# Patient Record
Sex: FEMALE | Race: WHITE | Employment: OTHER | URBAN - METROPOLITAN AREA
[De-identification: names, ages, dates, MRNs, and addresses within clinical notes are randomized per-mention and may not be internally consistent; named-entity substitution may affect disease eponyms.]

---

## 2019-04-01 ENCOUNTER — OFFICE VISIT (OUTPATIENT)
Dept: NEUROLOGY | Facility: CLINIC | Age: 54
End: 2019-04-01
Payer: MEDICARE

## 2019-04-01 VITALS
WEIGHT: 280 LBS | HEART RATE: 84 BPM | DIASTOLIC BLOOD PRESSURE: 80 MMHG | BODY MASS INDEX: 41.47 KG/M2 | HEIGHT: 69 IN | SYSTOLIC BLOOD PRESSURE: 138 MMHG | RESPIRATION RATE: 16 BRPM

## 2019-04-01 DIAGNOSIS — M25.511 CHRONIC RIGHT SHOULDER PAIN: Primary | ICD-10-CM

## 2019-04-01 DIAGNOSIS — G89.29 CHRONIC RIGHT SHOULDER PAIN: Primary | ICD-10-CM

## 2019-04-01 DIAGNOSIS — Z98.1 S/P CERVICAL SPINAL FUSION: ICD-10-CM

## 2019-04-01 DIAGNOSIS — M54.12 CERVICAL RADICULOPATHY: ICD-10-CM

## 2019-04-01 DIAGNOSIS — M54.2 NECK PAIN ON RIGHT SIDE: ICD-10-CM

## 2019-04-01 DIAGNOSIS — M67.911 DYSFUNCTION OF RIGHT ROTATOR CUFF: ICD-10-CM

## 2019-04-01 PROBLEM — Q76.1 CERVICAL FUSION SYNDROME: Status: ACTIVE | Noted: 2019-04-01

## 2019-04-01 PROBLEM — Q76.1 CERVICAL FUSION SYNDROME: Status: RESOLVED | Noted: 2019-04-01 | Resolved: 2019-04-01

## 2019-04-01 PROCEDURE — 99204 OFFICE O/P NEW MOD 45 MIN: CPT | Performed by: PHYSICAL MEDICINE & REHABILITATION

## 2019-04-01 RX ORDER — AMITRIPTYLINE HYDROCHLORIDE 25 MG/1
TABLET, FILM COATED ORAL AS NEEDED
Refills: 0 | COMMUNITY
Start: 2019-02-25

## 2019-04-01 NOTE — PROGRESS NOTES
Cervical Pain H & P    Chief Complaint:  Patient presents with:  Arm Pain: new patient here for knot on the shoulder blade that radiates down the right arm pain currently 6/10      HPI:  Juliane Mark is a 53year old year old right handed female with a pr history.     Social History   Social History    Socioeconomic History      Marital status: Unknown      Spouse name: Not on file      Number of children: Not on file      Years of education: Not on file      Highest education level: Not on file    Occupatio intolerance.   Neuro:   Negative for headaches, memory impairment  Psych:   Positive for anxiety, depression, and coccasional insomnia  Integumentary: Negative for rash, skin lesion  Hema/Lymph:  Negative for easy bleeding and easy bruising  Allergic/Immuno external rotators Right: 3+/5  Serratus anterior Right: 3/5   Reflexes: 2+ In the bilateral upper extremities. Valencia's sign Right: Negative   Valencia's sigh Left: Negative     Shoulder: The shoulders are stable.     Medial Border Scapular Winging: absen

## 2019-04-01 NOTE — PATIENT INSTRUCTIONS
Plan  She will get a right shoulder x-ray and cervical spine x-rays. I will do an ultrasound of the right shoulder for diagnostic purposes.     Once I have the results of the above, I will be better able to determine if she will need to have PT on the

## 2019-04-25 ENCOUNTER — TELEPHONE (OUTPATIENT)
Dept: NEUROLOGY | Facility: CLINIC | Age: 54
End: 2019-04-25

## 2019-04-25 NOTE — TELEPHONE ENCOUNTER
Right arm and top of shoulder, which worsened about a week and a half ago when pt fell, rates pain 9/10 and is constant, worsens w/ different positions, has to brace arm to body, is unable to straighten it.  Described pain: begins in Right side of neck down

## 2019-04-29 NOTE — TELEPHONE ENCOUNTER
Spoke to patient and informed her of Dr. Caitlin Daniels response. Patient verbalized understanding and offered appt tomorrow but declined as she has another appt.  Patient confirmed appt in [facility] 5/1/19 at 9:30a and says she will go early and have Xrays done pr

## 2019-05-01 ENCOUNTER — HOSPITAL ENCOUNTER (OUTPATIENT)
Dept: GENERAL RADIOLOGY | Age: 54
Discharge: HOME | End: 2019-05-01
Attending: PHYSICAL MEDICINE & REHABILITATION
Payer: MEDICARE

## 2019-05-01 ENCOUNTER — OFFICE VISIT (OUTPATIENT)
Dept: NEUROLOGY | Facility: CLINIC | Age: 54
End: 2019-05-01
Payer: MEDICARE

## 2019-05-01 ENCOUNTER — TELEPHONE (OUTPATIENT)
Dept: NEUROLOGY | Facility: CLINIC | Age: 54
End: 2019-05-01

## 2019-05-01 VITALS
HEIGHT: 69 IN | BODY MASS INDEX: 41.32 KG/M2 | HEART RATE: 80 BPM | WEIGHT: 279 LBS | RESPIRATION RATE: 16 BRPM | DIASTOLIC BLOOD PRESSURE: 100 MMHG | SYSTOLIC BLOOD PRESSURE: 148 MMHG

## 2019-05-01 DIAGNOSIS — M54.2 NECK PAIN ON RIGHT SIDE: ICD-10-CM

## 2019-05-01 DIAGNOSIS — M67.911 DYSFUNCTION OF RIGHT ROTATOR CUFF: ICD-10-CM

## 2019-05-01 DIAGNOSIS — G89.29 CHRONIC RIGHT SHOULDER PAIN: ICD-10-CM

## 2019-05-01 DIAGNOSIS — M50.90 CERVICAL DISC DISEASE: ICD-10-CM

## 2019-05-01 DIAGNOSIS — Z98.1 S/P CERVICAL SPINAL FUSION: ICD-10-CM

## 2019-05-01 DIAGNOSIS — M75.121 NONTRAUMATIC COMPLETE TEAR OF RIGHT ROTATOR CUFF: ICD-10-CM

## 2019-05-01 DIAGNOSIS — M54.12 CERVICAL RADICULOPATHY: ICD-10-CM

## 2019-05-01 DIAGNOSIS — M25.511 CHRONIC RIGHT SHOULDER PAIN: Primary | ICD-10-CM

## 2019-05-01 DIAGNOSIS — G89.29 CHRONIC RIGHT SHOULDER PAIN: Primary | ICD-10-CM

## 2019-05-01 DIAGNOSIS — M25.511 CHRONIC RIGHT SHOULDER PAIN: ICD-10-CM

## 2019-05-01 PROBLEM — S46.011A TRAUMATIC COMPLETE TEAR OF RIGHT ROTATOR CUFF: Status: RESOLVED | Noted: 2019-05-01 | Resolved: 2019-05-01

## 2019-05-01 PROBLEM — S46.011A TRAUMATIC COMPLETE TEAR OF RIGHT ROTATOR CUFF: Status: ACTIVE | Noted: 2019-05-01

## 2019-05-01 PROCEDURE — 76881 US COMPL JOINT R-T W/IMG: CPT | Performed by: PHYSICAL MEDICINE & REHABILITATION

## 2019-05-01 PROCEDURE — 72050 X-RAY EXAM NECK SPINE 4/5VWS: CPT | Performed by: PHYSICAL MEDICINE & REHABILITATION

## 2019-05-01 PROCEDURE — 20611 DRAIN/INJ JOINT/BURSA W/US: CPT | Performed by: PHYSICAL MEDICINE & REHABILITATION

## 2019-05-01 PROCEDURE — 73030 X-RAY EXAM OF SHOULDER: CPT | Performed by: PHYSICAL MEDICINE & REHABILITATION

## 2019-05-01 RX ORDER — TRIAMCINOLONE ACETONIDE 40 MG/ML
60 INJECTION, SUSPENSION INTRA-ARTICULAR; INTRAMUSCULAR ONCE
Status: COMPLETED | OUTPATIENT
Start: 2019-05-01 | End: 2019-05-01

## 2019-05-01 RX ORDER — LIDOCAINE HYDROCHLORIDE 10 MG/ML
2.5 INJECTION, SOLUTION INFILTRATION; PERINEURAL ONCE
Status: COMPLETED | OUTPATIENT
Start: 2019-05-01 | End: 2019-05-01

## 2019-05-01 NOTE — TELEPHONE ENCOUNTER
Pt. informed insurance was verified and MRI right shoulder wo cpt code 47078  is a covered benefit and does not require authorization. Can proceed with scheduling appt.

## 2019-05-01 NOTE — PROCEDURES
I did an ultrasound examination of the right shoulder in the office today:   Biceps Tendon:   Normal   Subscapularis Tendon:  Mild bursal surface partial thickness tear  AC Joint:   Normal  Impingement Sign:  Normal  Subdeltoid Bursa:  Normal  Posterior Gl

## 2019-05-01 NOTE — TELEPHONE ENCOUNTER
Medicare Online for insurance coverage of Right shoulder injection under ultrasound guidance. cpt codes 36089, [id number] . Insurance was verified and procedure is a covered benefit and does not require authorization. Kae PORTILLO CMA informed.

## 2019-05-03 ENCOUNTER — MED REC SCAN ONLY (OUTPATIENT)
Dept: NEUROLOGY | Facility: CLINIC | Age: 54
End: 2019-05-03

## 2019-05-07 ENCOUNTER — HOSPITAL ENCOUNTER (OUTPATIENT)
Dept: MRI IMAGING | Facility: HOSPITAL | Age: 54
Discharge: HOME | End: 2019-05-07
Attending: PHYSICAL MEDICINE & REHABILITATION
Payer: MEDICARE

## 2019-05-07 DIAGNOSIS — M25.511 CHRONIC RIGHT SHOULDER PAIN: ICD-10-CM

## 2019-05-07 DIAGNOSIS — M75.121 NONTRAUMATIC COMPLETE TEAR OF RIGHT ROTATOR CUFF: ICD-10-CM

## 2019-05-07 DIAGNOSIS — G89.29 CHRONIC RIGHT SHOULDER PAIN: ICD-10-CM

## 2019-05-07 DIAGNOSIS — M67.911 DYSFUNCTION OF RIGHT ROTATOR CUFF: ICD-10-CM

## 2019-05-07 PROCEDURE — 73221 MRI JOINT UPR EXTREM W/O DYE: CPT | Performed by: PHYSICAL MEDICINE & REHABILITATION

## 2019-05-16 PROBLEM — S46.011A TRAUMATIC COMPLETE TEAR OF RIGHT ROTATOR CUFF, INITIAL ENCOUNTER: Status: ACTIVE | Noted: 2019-05-16

## 2019-05-16 PROBLEM — M25.511 ACUTE PAIN OF RIGHT SHOULDER: Status: ACTIVE | Noted: 2019-05-16

## 2019-06-20 PROBLEM — Z47.89 AFTERCARE FOLLOWING SURGERY OF THE MUSCULOSKELETAL SYSTEM: Status: ACTIVE | Noted: 2019-06-20

## 2021-11-30 ENCOUNTER — LAB ENCOUNTER (OUTPATIENT)
Dept: LAB | Facility: HOSPITAL | Age: 56
End: 2021-11-30
Attending: INTERNAL MEDICINE
Payer: MEDICARE

## 2021-11-30 ENCOUNTER — HOSPITAL ENCOUNTER (OUTPATIENT)
Dept: ULTRASOUND IMAGING | Facility: HOSPITAL | Age: 56
Discharge: HOME | End: 2021-11-30
Attending: INTERNAL MEDICINE
Payer: MEDICARE

## 2021-11-30 DIAGNOSIS — N18.30 STAGE 3 CHRONIC KIDNEY DISEASE, UNSPECIFIED WHETHER STAGE 3A OR 3B CKD (HCC): ICD-10-CM

## 2021-11-30 DIAGNOSIS — Z90.5 ACQUIRED ABSENCE OF KIDNEY: ICD-10-CM

## 2021-11-30 DIAGNOSIS — N18.32 CHRONIC KIDNEY DISEASE (CKD) STAGE G3B/A1, MODERATELY DECREASED GLOMERULAR FILTRATION RATE (GFR) BETWEEN 30-44 ML/MIN/1.73 SQUARE METER AND ALBUMINURIA CREATININE RATIO LESS THAN 30 MG/G (HCC): Primary | ICD-10-CM

## 2021-11-30 DIAGNOSIS — E66.9 OBESITY, UNSPECIFIED: ICD-10-CM

## 2021-11-30 DIAGNOSIS — R03.0 ELEVATED BLOOD PRESSURE READING WITHOUT DIAGNOSIS OF HYPERTENSION: ICD-10-CM

## 2021-11-30 PROCEDURE — 85018 HEMOGLOBIN: CPT

## 2021-11-30 PROCEDURE — 80069 RENAL FUNCTION PANEL: CPT

## 2021-11-30 PROCEDURE — 76775 US EXAM ABDO BACK WALL LIM: CPT | Performed by: INTERNAL MEDICINE

## 2021-11-30 PROCEDURE — 82570 ASSAY OF URINE CREATININE: CPT

## 2021-11-30 PROCEDURE — 36415 COLL VENOUS BLD VENIPUNCTURE: CPT

## 2021-11-30 PROCEDURE — 84156 ASSAY OF PROTEIN URINE: CPT

## 2021-11-30 PROCEDURE — 82043 UR ALBUMIN QUANTITATIVE: CPT

## 2021-11-30 PROCEDURE — 83970 ASSAY OF PARATHORMONE: CPT

## 2021-11-30 PROCEDURE — 85014 HEMATOCRIT: CPT

## 2022-02-09 ENCOUNTER — HOSPITAL ENCOUNTER (EMERGENCY)
Facility: HOSPITAL | Age: 57
Discharge: HOME | End: 2022-02-09
Payer: MEDICARE

## 2022-02-09 VITALS
SYSTOLIC BLOOD PRESSURE: 115 MMHG | BODY MASS INDEX: 40.6 KG/M2 | DIASTOLIC BLOOD PRESSURE: 75 MMHG | RESPIRATION RATE: 18 BRPM | OXYGEN SATURATION: 99 % | TEMPERATURE: 98 F | HEIGHT: 68.5 IN | HEART RATE: 97 BPM | WEIGHT: 271 LBS

## 2022-02-09 DIAGNOSIS — T14.8XXA SPLINTER: Primary | ICD-10-CM

## 2022-02-09 PROCEDURE — 99283 EMERGENCY DEPT VISIT LOW MDM: CPT

## 2022-02-09 RX ORDER — CEPHALEXIN 500 MG/1
500 CAPSULE ORAL 3 TIMES DAILY
Qty: 21 CAPSULE | Refills: 0 | Status: SHIPPED | OUTPATIENT
Start: 2022-02-09 | End: 2022-02-16

## 2022-07-13 ENCOUNTER — APPOINTMENT (OUTPATIENT)
Dept: GENERAL RADIOLOGY | Facility: HOSPITAL | Age: 57
End: 2022-07-13
Attending: EMERGENCY MEDICINE
Payer: MEDICARE

## 2022-07-13 ENCOUNTER — HOSPITAL ENCOUNTER (EMERGENCY)
Facility: HOSPITAL | Age: 57
Discharge: HOME | End: 2022-07-13
Attending: EMERGENCY MEDICINE
Payer: MEDICARE

## 2022-07-13 VITALS
TEMPERATURE: 98 F | HEART RATE: 87 BPM | BODY MASS INDEX: 40.92 KG/M2 | RESPIRATION RATE: 18 BRPM | DIASTOLIC BLOOD PRESSURE: 80 MMHG | SYSTOLIC BLOOD PRESSURE: 120 MMHG | OXYGEN SATURATION: 96 % | HEIGHT: 68 IN | WEIGHT: 270 LBS

## 2022-07-13 DIAGNOSIS — U07.1 COVID-19 VIRUS INFECTION: Primary | ICD-10-CM

## 2022-07-13 LAB
S PYO AG THROAT QL: NEGATIVE
SARS-COV-2 RNA RESP QL NAA+PROBE: DETECTED

## 2022-07-13 PROCEDURE — 99283 EMERGENCY DEPT VISIT LOW MDM: CPT

## 2022-07-13 PROCEDURE — 71045 X-RAY EXAM CHEST 1 VIEW: CPT | Performed by: EMERGENCY MEDICINE

## 2022-07-13 PROCEDURE — 87880 STREP A ASSAY W/OPTIC: CPT

## 2022-07-13 NOTE — ED QUICK NOTES
Patient cleared for discharge by MD. Brito with patient. Patient discharge instructions reviewed with patient including when and how to follow up  with healthcare provider and when to seek medical treatment.

## 2022-09-13 ENCOUNTER — LAB ENCOUNTER (OUTPATIENT)
Dept: LAB | Facility: HOSPITAL | Age: 57
End: 2022-09-13
Attending: INTERNAL MEDICINE
Payer: MEDICARE

## 2022-09-13 DIAGNOSIS — N25.81 SECONDARY HYPERPARATHYROIDISM OF RENAL ORIGIN (HCC): ICD-10-CM

## 2022-09-13 DIAGNOSIS — R03.0 ELEVATED BLOOD PRESSURE READING WITHOUT DIAGNOSIS OF HYPERTENSION: ICD-10-CM

## 2022-09-13 DIAGNOSIS — Z90.5 ACQUIRED ABSENCE OF KIDNEY: ICD-10-CM

## 2022-09-13 DIAGNOSIS — N18.31 CHRONIC KIDNEY DISEASE (CKD) STAGE G3A/A1, MODERATELY DECREASED GLOMERULAR FILTRATION RATE (GFR) BETWEEN 45-59 ML/MIN/1.73 SQUARE METER AND ALBUMINURIA CREATININE RATIO LESS THAN 30 MG/G (HCC): Primary | ICD-10-CM

## 2022-09-13 DIAGNOSIS — N18.31 STAGE 3A CHRONIC KIDNEY DISEASE (HCC): ICD-10-CM

## 2022-09-13 DIAGNOSIS — Z90.5 SOLITARY KIDNEY, ACQUIRED: ICD-10-CM

## 2022-09-13 LAB
ALBUMIN SERPL-MCNC: 3.8 G/DL (ref 3.4–5)
ANION GAP SERPL CALC-SCNC: 6 MMOL/L (ref 0–18)
BILIRUB UR QL: NEGATIVE
BUN BLD-MCNC: 20 MG/DL (ref 7–18)
BUN/CREAT SERPL: 16.3 (ref 10–20)
CALCIUM BLD-MCNC: 9 MG/DL (ref 8.5–10.1)
CHLORIDE SERPL-SCNC: 106 MMOL/L (ref 98–112)
CLARITY UR: CLEAR
CO2 SERPL-SCNC: 26 MMOL/L (ref 21–32)
COLOR UR: YELLOW
CREAT BLD-MCNC: 1.23 MG/DL
CREAT UR-SCNC: 54.2 MG/DL
GFR SERPLBLD BASED ON 1.73 SQ M-ARVRAT: 51 ML/MIN/1.73M2 (ref 60–?)
GLUCOSE BLD-MCNC: 87 MG/DL (ref 70–99)
GLUCOSE UR-MCNC: NEGATIVE MG/DL
HGB UR QL STRIP.AUTO: NEGATIVE
KETONES UR-MCNC: NEGATIVE MG/DL
LEUKOCYTE ESTERASE UR QL STRIP.AUTO: NEGATIVE
NITRITE UR QL STRIP.AUTO: NEGATIVE
OSMOLALITY SERPL CALC.SUM OF ELEC: 288 MOSM/KG (ref 275–295)
PH UR: 5.5 [PH] (ref 5–8)
PHOSPHATE SERPL-MCNC: 2.8 MG/DL (ref 2.5–4.9)
POTASSIUM SERPL-SCNC: 4.4 MMOL/L (ref 3.5–5.1)
PROT UR-MCNC: <5 MG/DL
PROT UR-MCNC: NEGATIVE MG/DL
SODIUM SERPL-SCNC: 138 MMOL/L (ref 136–145)
SP GR UR STRIP: 1.01 (ref 1–1.03)
UROBILINOGEN UR STRIP-ACNC: 0.2

## 2022-09-13 PROCEDURE — 82570 ASSAY OF URINE CREATININE: CPT

## 2022-09-13 PROCEDURE — 84156 ASSAY OF PROTEIN URINE: CPT

## 2022-09-13 PROCEDURE — 81015 MICROSCOPIC EXAM OF URINE: CPT

## 2022-09-13 PROCEDURE — 81001 URINALYSIS AUTO W/SCOPE: CPT

## 2022-09-13 PROCEDURE — 36415 COLL VENOUS BLD VENIPUNCTURE: CPT

## 2022-09-13 PROCEDURE — 80069 RENAL FUNCTION PANEL: CPT

## 2022-11-17 ENCOUNTER — HOSPITAL ENCOUNTER (EMERGENCY)
Facility: HOSPITAL | Age: 57
Discharge: HOME | End: 2022-11-17
Attending: EMERGENCY MEDICINE
Payer: MEDICARE

## 2022-11-17 VITALS
RESPIRATION RATE: 16 BRPM | WEIGHT: 269 LBS | HEART RATE: 92 BPM | DIASTOLIC BLOOD PRESSURE: 84 MMHG | SYSTOLIC BLOOD PRESSURE: 133 MMHG | BODY MASS INDEX: 42.22 KG/M2 | TEMPERATURE: 98 F | HEIGHT: 67 IN | OXYGEN SATURATION: 95 %

## 2022-11-17 DIAGNOSIS — K62.5 RECTAL BLEEDING: Primary | ICD-10-CM

## 2022-11-17 LAB
ANION GAP SERPL CALC-SCNC: 6 MMOL/L (ref 0–18)
BASOPHILS # BLD AUTO: 0.04 X10(3) UL (ref 0–0.2)
BASOPHILS NFR BLD AUTO: 0.5 %
BUN BLD-MCNC: 15 MG/DL (ref 7–18)
BUN/CREAT SERPL: 11.7 (ref 10–20)
CALCIUM BLD-MCNC: 9.3 MG/DL (ref 8.5–10.1)
CHLORIDE SERPL-SCNC: 107 MMOL/L (ref 98–112)
CO2 SERPL-SCNC: 25 MMOL/L (ref 21–32)
CREAT BLD-MCNC: 1.28 MG/DL
DEPRECATED RDW RBC AUTO: 43 FL (ref 35.1–46.3)
EOSINOPHIL # BLD AUTO: 0.16 X10(3) UL (ref 0–0.7)
EOSINOPHIL NFR BLD AUTO: 2.1 %
ERYTHROCYTE [DISTWIDTH] IN BLOOD BY AUTOMATED COUNT: 12.6 % (ref 11–15)
GFR SERPLBLD BASED ON 1.73 SQ M-ARVRAT: 49 ML/MIN/1.73M2 (ref 60–?)
GLUCOSE BLD-MCNC: 92 MG/DL (ref 70–99)
HCT VFR BLD AUTO: 42.4 %
HGB BLD-MCNC: 14.2 G/DL
IMM GRANULOCYTES # BLD AUTO: 0.04 X10(3) UL (ref 0–1)
IMM GRANULOCYTES NFR BLD: 0.5 %
LYMPHOCYTES # BLD AUTO: 2.65 X10(3) UL (ref 1–4)
LYMPHOCYTES NFR BLD AUTO: 34.8 %
MCH RBC QN AUTO: 30.8 PG (ref 26–34)
MCHC RBC AUTO-ENTMCNC: 33.5 G/DL (ref 31–37)
MCV RBC AUTO: 92 FL
MONOCYTES # BLD AUTO: 0.44 X10(3) UL (ref 0.1–1)
MONOCYTES NFR BLD AUTO: 5.8 %
NEUTROPHILS # BLD AUTO: 4.29 X10 (3) UL (ref 1.5–7.7)
NEUTROPHILS # BLD AUTO: 4.29 X10(3) UL (ref 1.5–7.7)
NEUTROPHILS NFR BLD AUTO: 56.3 %
OSMOLALITY SERPL CALC.SUM OF ELEC: 286 MOSM/KG (ref 275–295)
PLATELET # BLD AUTO: 246 10(3)UL (ref 150–450)
POTASSIUM SERPL-SCNC: 4.5 MMOL/L (ref 3.5–5.1)
RBC # BLD AUTO: 4.61 X10(6)UL
SODIUM SERPL-SCNC: 138 MMOL/L (ref 136–145)
WBC # BLD AUTO: 7.6 X10(3) UL (ref 4–11)

## 2022-11-17 PROCEDURE — 80048 BASIC METABOLIC PNL TOTAL CA: CPT | Performed by: EMERGENCY MEDICINE

## 2022-11-17 PROCEDURE — 36415 COLL VENOUS BLD VENIPUNCTURE: CPT

## 2022-11-17 PROCEDURE — 99283 EMERGENCY DEPT VISIT LOW MDM: CPT

## 2022-11-17 PROCEDURE — 85025 COMPLETE CBC W/AUTO DIFF WBC: CPT | Performed by: EMERGENCY MEDICINE

## 2022-11-17 PROCEDURE — 99284 EMERGENCY DEPT VISIT MOD MDM: CPT

## 2022-11-17 RX ORDER — ACETAMINOPHEN 500 MG
1000 TABLET ORAL ONCE
Status: COMPLETED | OUTPATIENT
Start: 2022-11-17 | End: 2022-11-17

## 2022-11-17 RX ORDER — DULOXETIN HYDROCHLORIDE 30 MG/1
30 CAPSULE, DELAYED RELEASE ORAL DAILY
COMMUNITY

## 2022-11-17 RX ORDER — BUSPIRONE HYDROCHLORIDE 10 MG/1
10 TABLET ORAL NIGHTLY
COMMUNITY

## 2022-11-17 NOTE — DISCHARGE INSTRUCTIONS
Your hemoglobin is stable today. Call the GI clinic to see if you can get an earlier appointment. Return to the ER for heavy and persistent bleeding.

## 2022-11-17 NOTE — ED INITIAL ASSESSMENT (HPI)
Pt to ED c.o rectal bleeding bright red blood espeically when she wipes and stool and water is dark red every time she had BM x past 3 weeks, PMD Abdi Lopez advised her to come in to make sure she's not losing too much blood, denies dizziness. +fatigue. No CP/SOB, not taking a blood thinner.

## 2023-04-28 ENCOUNTER — ANESTHESIA (OUTPATIENT)
Dept: ENDOSCOPY | Facility: HOSPITAL | Age: 58
End: 2023-04-28
Payer: MEDICARE

## 2023-04-28 ENCOUNTER — ANESTHESIA EVENT (OUTPATIENT)
Dept: ENDOSCOPY | Facility: HOSPITAL | Age: 58
End: 2023-04-28
Payer: MEDICARE

## 2023-04-28 ENCOUNTER — HOSPITAL ENCOUNTER (OUTPATIENT)
Facility: HOSPITAL | Age: 58
Setting detail: HOSPITAL OUTPATIENT SURGERY
Discharge: HOME | End: 2023-04-28
Attending: INTERNAL MEDICINE | Admitting: INTERNAL MEDICINE
Payer: MEDICARE

## 2023-04-28 VITALS
BODY MASS INDEX: 41.37 KG/M2 | SYSTOLIC BLOOD PRESSURE: 129 MMHG | HEIGHT: 68 IN | RESPIRATION RATE: 13 BRPM | HEART RATE: 78 BPM | TEMPERATURE: 97 F | WEIGHT: 273 LBS | OXYGEN SATURATION: 97 % | DIASTOLIC BLOOD PRESSURE: 79 MMHG

## 2023-04-28 DIAGNOSIS — K62.5 RECTAL BLEEDING: ICD-10-CM

## 2023-04-28 DIAGNOSIS — R19.4 ALTERED BOWEL HABITS: ICD-10-CM

## 2023-04-28 DIAGNOSIS — R10.30 LOWER ABDOMINAL PAIN: ICD-10-CM

## 2023-04-28 PROCEDURE — 0DBN8ZX EXCISION OF SIGMOID COLON, VIA NATURAL OR ARTIFICIAL OPENING ENDOSCOPIC, DIAGNOSTIC: ICD-10-PCS | Performed by: INTERNAL MEDICINE

## 2023-04-28 PROCEDURE — 0DBM8ZX EXCISION OF DESCENDING COLON, VIA NATURAL OR ARTIFICIAL OPENING ENDOSCOPIC, DIAGNOSTIC: ICD-10-PCS | Performed by: INTERNAL MEDICINE

## 2023-04-28 PROCEDURE — 0DBK8ZX EXCISION OF ASCENDING COLON, VIA NATURAL OR ARTIFICIAL OPENING ENDOSCOPIC, DIAGNOSTIC: ICD-10-PCS | Performed by: INTERNAL MEDICINE

## 2023-04-28 PROCEDURE — 3E0H8KZ INTRODUCTION OF OTHER DIAGNOSTIC SUBSTANCE INTO LOWER GI, VIA NATURAL OR ARTIFICIAL OPENING ENDOSCOPIC: ICD-10-PCS | Performed by: INTERNAL MEDICINE

## 2023-04-28 PROCEDURE — 88305 TISSUE EXAM BY PATHOLOGIST: CPT | Performed by: INTERNAL MEDICINE

## 2023-04-28 PROCEDURE — 0DBL8ZX EXCISION OF TRANSVERSE COLON, VIA NATURAL OR ARTIFICIAL OPENING ENDOSCOPIC, DIAGNOSTIC: ICD-10-PCS | Performed by: INTERNAL MEDICINE

## 2023-04-28 RX ORDER — LIDOCAINE HYDROCHLORIDE 10 MG/ML
INJECTION, SOLUTION EPIDURAL; INFILTRATION; INTRACAUDAL; PERINEURAL AS NEEDED
Status: DISCONTINUED | OUTPATIENT
Start: 2023-04-28 | End: 2023-04-28 | Stop reason: SURG

## 2023-04-28 RX ORDER — SODIUM CHLORIDE, SODIUM LACTATE, POTASSIUM CHLORIDE, CALCIUM CHLORIDE 600; 310; 30; 20 MG/100ML; MG/100ML; MG/100ML; MG/100ML
INJECTION, SOLUTION INTRAVENOUS CONTINUOUS
Status: DISCONTINUED | OUTPATIENT
Start: 2023-04-28 | End: 2023-04-28

## 2023-04-28 RX ADMIN — SODIUM CHLORIDE, SODIUM LACTATE, POTASSIUM CHLORIDE, CALCIUM CHLORIDE: 600; 310; 30; 20 INJECTION, SOLUTION INTRAVENOUS at 10:46:00

## 2023-04-28 RX ADMIN — LIDOCAINE HYDROCHLORIDE 50 MG: 10 INJECTION, SOLUTION EPIDURAL; INFILTRATION; INTRACAUDAL; PERINEURAL at 10:49:00

## 2023-04-28 RX ADMIN — SODIUM CHLORIDE, SODIUM LACTATE, POTASSIUM CHLORIDE, CALCIUM CHLORIDE: 600; 310; 30; 20 INJECTION, SOLUTION INTRAVENOUS at 11:48:00

## 2023-04-28 NOTE — DISCHARGE INSTRUCTIONS
ENDOSCOPY DISCHARGE INSTRUCTIONS    Procedure Performed:   Colonoscopy    Endoscopist: No name on file  FINDINGS:   Diverticulosis (pockets in colon that develop with age and lack of fiber intake)  There were 10 polyps in your colon which were removed and internal hemorrhoids. MEDICATIONS:  You may resume all other medications today    DIET:  Resume Normal Diet    BIOPSIES:  Biopsies were taken (you will be notified of results in 7-10 days)    X-RAYS/LABS:   No X-rays/Labs were ordered today    ADDITIONAL RECOMMENDATIONS:    - Repeat Colonoscopy in 6-12 months given multiple large polyps. - Start Miralax (1 capful of powder in water daily)  - Avoid stimulant laxatives   - Bleeding due to hemorrhoids  - Follow up in clinic in 2-3 months to assess response and symptoms    Activity for remainder of today:    REST TODAY  DO NOT drive or operate heavy machinery  DO NOT drink any alcoholic beverages  DO NOT sign any legal documents or make any important decisions    After your procedure(s): It is not unusual to feel bloated or gassy . Passing gas and belching is encouraged. Lying on your left side with your knees flexed may relieve the discomfort. A hot pack to the abdomen may also help. FOLLOW-UP:  Contact the office at [phone] for follow-up appointment is needed or if you develop any of the following:    Severe abdominal pain/discomfort     Excessive bleeding                     Black tarry stool    Difficulty breathing/swallowing      Persistent nausea/vomiting  Fever above 100 degrees or chills    Home Care Instructions for Colonoscopy with Sedation    Diet:  - Resume your regular diet as tolerated unless otherwise instructed. - Start with light meals to minimize bloating.  - Do not drink alcohol today. Medication:  - If you have questions about resuming your normal medications, please contact your Primary Care Physician. Activities:  - Take it easy today. Do not return to work today.   - Do not drive today. - Do not operate any machinery today (including kitchen equipment). Colonoscopy:  - You may notice some rectal \"spotting\" (a little blood on the toilet tissue) for a day or two after the exam. This is normal.  - If you experience any rectal bleeding (not spotting), persistent tenderness or sharp severe abdominal pains, oral temperature over 100 degrees Fahrenheit, light-headedness or dizziness, or any other problems, contact your doctor. **If unable to reach your doctor, please go to the [facility] Emergency Room**    - Your referring physician will receive a full report of your examination.  - If you do not hear from your doctor's office within two weeks of your biopsy, please call them for your results. You may be able to see your laboratory results in [facility] between 4 and 7 business days. In some cases, your physician may not have viewed the results before they are released to [address] If you have questions regarding your results contact the physician who ordered the test/exam by phone or via [address] by choosing \"Ask a Medical Question. \"

## 2023-04-28 NOTE — ANESTHESIA POSTPROCEDURE EVALUATION
Patient: Myriam Stallings    Procedure Summary     Date: 04/28/23 Room / Location: [address] [place] [address] ENDOSCOPY 01 / [address] [place] [address] ENDOSCOPY    Anesthesia Start: 0791 Anesthesia Stop:     Procedure: COLONOSCOPY Diagnosis:       Lower abdominal pain      Rectal bleeding      Altered bowel habits      (melanosis coli, colon polyps, hemorrhoids, diverticulosis)    Surgeons: Tahira Ugalde MD Anesthesiologist: Gretel Davila CRNA    Anesthesia Type: general ASA Status: 3          Anesthesia Type: general    Vitals Value Taken Time   /95 04/28/23 1148   Temp  04/28/23 1149   Pulse 107 04/28/23 1148   Resp 18 04/28/23 1148   SpO2 93 % 04/28/23 1148   Vitals shown include unvalidated device data.     [address] [place] [address] AN Post Evaluation:   Patient Evaluated in PACU  Patient Participation: complete - patient participated  Level of Consciousness: sleepy but conscious  Pain Score: 0  Pain Management: adequate  Airway Patency:patent  Dental exam unchanged from preop  Yes    Cardiovascular Status: hemodynamically stable  Respiratory Status: room air  Postoperative Hydration acceptable      Delfina Herndon CRNA  4/28/2023 11:49 AM

## 2023-04-28 NOTE — OPERATIVE REPORT
Colonoscopy Operative Report    Constantino Pinon Patient Status:  Hospital Outpatient Surgery    1965 MRN [id number]   Location [facility] ENDOSCOPY LAB SUITES Attending Robby Campos MD   Hosp Day #   0 PCP Mahesh Alejandro MD     Pre-Operative Diagnosis: Lower abdominal pain/Rectal bleeding/Altered bowel habits    Post-Operative Diagnosis:  Polyps x 10  Diverticulosis  Melanosis Coli  Grade 2 Internal Hemorrhoids    Procedure Performed: COLONOSCOPY w/Hot Snare Polypectomy    Informed Consent: Informed consent for both the procedure and sedation were obtained from the patient. The potentially life-threatening complications of sedation, bleeding,  perforation, transfusion or repeat endoscopy  were reviewed along with the possible need for hospitalization, surgical management, transfusion or repeat endoscopy should one of these complications arise. The patient understands and is agreeable to proceed. Sedation Type: MAC-Patient received sedation with monitored anesthesia provided by an anesthesiologist  Moderate Sedation Time: NA  A trained sedation nurse was present to assist in monitoring the patient during the entire length of moderate sedation time. Cecum Withdrawal Time:  55 Minutes  Date of previous colonoscopy:     Procedure Description: The patient was placed in the left lateral decubitus position. After careful digital rectal examination, the Adult colonoscope was inserted into the rectum and advanced to the level of the cecum under direct visualization. The cecum was identified by landmarks, including the appendiceal orifice and ileoceccal valve. Careful examination of the entire colon was performed during withdrawal of the endoscope. The scope was withdrawn to the rectum and retroflexion was performed. The patient tolerated the procedure well with no immediate complications.  The patient was transferred to the recovery area in stable condition. Quality of Preparation: Adequate  Aronchick Bowel Prep Scale:  2    Findings:   Colon:    - 1 flat polyp measuring 12 mm was found in the distal ascending colon s/p removal with hot snare. Site was tattooed on proximal wall. Clips x 2 placed to prevent bleeding.  - 1 large sessile serrated appearing polyp measuring 15 mm was found in the hepatic flexure colon s/p removal with hot snare with cold snare on the edges. Site was tattooed on proximal wall. Clips x 2 placed to prevent bleeding.  - 1 polyp measuring 4 mm was found in the hepatic flexure colon s/p removal with cold snare.  - 2 polyps measuring 4-5 mm was found in the proximal transverse colon s/p removal with cold snare. - 1 sessile serrated polyp measuring 12-13 mm was found in the proximal transverse colon s/p removal with cold snare in piecemeal fashion.  - 2 polyps measuring 3-4 mm was found in the descedning colon s/p removal with cold snare. - 1 polyp measuring 5 mm was found in the sigmoid colon s/p removal with cold snare. - 1 peduncualted polyp measuring 10 mm was found in the sigmoid colon s/p removal with hot snare.  - Moderate diverticulosis in the sigmoid colon. - Melanosis coli  Rectum:  Grade 2 internal hemorrhoids seen on retroflexion. Normal manual rectal exam.      Recommendations:   - Repeat Colonoscopy in 6-12 months given multiple large polyps. - Start Miralax (1 capful of powder in water daily)  - Avoid stimulant laxatives   - Bleeding due to hemorrhoids  - Follow up in clinic in 2-3 months to assess response and symptoms    Discharge: The patient was given an after visit summary detailing the procedure, findings, recommendations and follow up plans.      Alvira Alpers, MD  4/28/2023  11:46 AM Other

## 2023-07-17 ENCOUNTER — OFFICE VISIT (OUTPATIENT)
Facility: CLINIC | Age: 58
End: 2023-07-17

## 2023-07-17 ENCOUNTER — OFFICE VISIT (OUTPATIENT)
Dept: SURGERY | Facility: CLINIC | Age: 58
End: 2023-07-17
Payer: MEDICARE

## 2023-07-17 VITALS
HEART RATE: 83 BPM | WEIGHT: 271 LBS | HEIGHT: 68 IN | SYSTOLIC BLOOD PRESSURE: 125 MMHG | BODY MASS INDEX: 41.07 KG/M2 | DIASTOLIC BLOOD PRESSURE: 80 MMHG

## 2023-07-17 VITALS
WEIGHT: 271 LBS | OXYGEN SATURATION: 93 % | DIASTOLIC BLOOD PRESSURE: 80 MMHG | HEART RATE: 76 BPM | BODY MASS INDEX: 44.07 KG/M2 | HEIGHT: 65.7 IN | SYSTOLIC BLOOD PRESSURE: 124 MMHG

## 2023-07-17 DIAGNOSIS — N18.30 STAGE 3 CHRONIC KIDNEY DISEASE, UNSPECIFIED WHETHER STAGE 3A OR 3B CKD (HCC): Primary | ICD-10-CM

## 2023-07-17 DIAGNOSIS — F32.A ANXIETY AND DEPRESSION: ICD-10-CM

## 2023-07-17 DIAGNOSIS — Z90.5 ACQUIRED SOLITARY KIDNEY: ICD-10-CM

## 2023-07-17 DIAGNOSIS — N18.9 CHRONIC KIDNEY DISEASE, UNSPECIFIED CKD STAGE: Primary | ICD-10-CM

## 2023-07-17 DIAGNOSIS — N25.81 SECONDARY HYPERPARATHYROIDISM OF RENAL ORIGIN (HCC): ICD-10-CM

## 2023-07-17 DIAGNOSIS — E66.01 CLASS 3 SEVERE OBESITY WITH BODY MASS INDEX (BMI) OF 40.0 TO 44.9 IN ADULT, UNSPECIFIED OBESITY TYPE, UNSPECIFIED WHETHER SERIOUS COMORBIDITY PRESENT (HCC): ICD-10-CM

## 2023-07-17 DIAGNOSIS — F41.9 ANXIETY AND DEPRESSION: ICD-10-CM

## 2023-07-17 DIAGNOSIS — E66.01 MORBID OBESITY WITH BMI OF 40.0-44.9, ADULT (HCC): ICD-10-CM

## 2023-07-17 DIAGNOSIS — E03.9 HYPOTHYROIDISM, UNSPECIFIED TYPE: ICD-10-CM

## 2023-07-17 NOTE — PATIENT INSTRUCTIONS
SLEEVE GASTRECTOMY  80% of patients reach at least 202 lbs 18 months after surgery. 50% of patients reach at least 184 lbs 18 months after surgery. 20% of patients reach 166 lbs 18 months after surgery. GASTRIC BYPASS  80% of patients reach at least 186 lbs 18 months after surgery. 50% of patients reach at least 169 lbs 18 months after surgery. 20% of patients reach 152 lbs 18 months after surgery. Bariatric Surgery  [address] [facility]  [place], [address]  [phone]  Fax: [phone]    3 PM nut serving    I recommend a whole food, plant powered diet with low glycemic index:     Aim for 3 meals a day and 1-2 snacks as needed. Aim for a protein + produce at each meal time. Keep meals between 7 am and 7 pm.     Breakfast ideas:  1. Fruit and nuts/seeds. 2. Eggs scrambled with vegetables. 3. Oatmeal (stovetop), cinnamon, 2 tbsp flaxseed, berries, nuts. 4. Protein shake + fruit. (1 scoop with water/ice). [place] [address], [place], [place], and [place] are good brands. Premiere or Muscle Milk     Snacks:  Raw vegetables and hummus, apples and peanut butter, nuts, seeds, fruit, pecans drizzled with organic honey. Use the Healthy Plate method for lunch and dinner:  1/2 right side of plate non-starchy vegetables. Bottom left 1/4 plate protein. Top left 1/4 starch as desired. Aim for a total of:  2 fruits a day (avocado, tomato, citrus/oranges, apples, berries)  1/2 cup or medium size    4 non-starchy vegetables (handful) (greens, peppers, onions, garlic, broccoli, cauliflower, etc.)    0-2 starches (oatmeal, sweet potato, carrots, brown rice, etc). 3 protein per day (fish, seafood, meat, or plants: salmon, nuts, seeds, shrimp, chicken, turkey, beans, lentils, chickpeas, etc).     2 healthy fats (avocado, avocado oil, olives, olive oil, salmon, nuts, seeds)

## 2023-07-17 NOTE — PROGRESS NOTES
[facility] NEPHROLOGY CLINIC    Progress Note     Constantino Pinon  60 yr old lady here for follow up. Known to have solitary right kidney ( s/p left nephrectomy following trauma ) with CKD  PMH HLD ( intolerance to most medications ) , Obesity & Nephrolithiasis ( remote h/o R kidney calculus)  . No h/o long term or recent OTC NSAID use No family h/o kidney disease   NO new issues  trying to lose weight. HISTORY:  Past Medical History:   Diagnosis Date    Anxiety     Arthritis     neck and back    Depression     Disorder of thyroid       Past Surgical History:   Procedure Laterality Date    ADDL NECK SPINE FUSION      ANKLE FRACTURE SURGERY      L ankle sx    COLONOSCOPY N/A 4/28/2023    Procedure: COLONOSCOPY;  Surgeon: Robby Campos MD;  Location: [address] [place] [address] ENDOSCOPY    NEPHRECTOMY Left            Medications (Active prior to today's visit):  Current Outpatient Medications   Medication Sig Dispense Refill    DULoxetine 30 MG Oral Cap DR Particles Take 1 capsule (30 mg total) by mouth daily. busPIRone 10 MG Oral Tab Take 1 tablet (10 mg total) by mouth at bedtime. Levothyroxine Sodium 125 MCG Oral Tab Take 1 tablet (125 mcg total) by mouth before breakfast.      nystatin 283225 UNIT/GM External Cream Apply topically.          Allergies:  No Known Allergies      ROS:     Constitutional:  Negative for decreased activity, fever, irritability and lethargy  ENMT:  Negative for ear drainage, hearing loss and nasal drainage  Eyes:  Negative for eye discharge and vision loss  Cardiovascular:  Negative for chest pain, sob  Respiratory:  Negative for cough, dyspnea and wheezing  Gastrointestinal:  Negative for abdominal pain, constipation  Genitourinary:  Negative for dysuria and hematuria  Endocrine:  Negative for abnormal sleep patterns  Hema/Lymph:  Negative for easy bleeding and easy bruising  Integumentary:  Negative for pruritus and rash  Musculoskeletal:  Negative for bone/joint symptoms  Neurological: Negative for gait disturbance  Psychiatric:  Negative for inappropriate interaction and psychiatric symptoms       07/17/23  1024   BP: 125/80   Pulse: 83       PHYSICAL EXAM:   Constitutional: appears well hydrated alert and responsive   Head/Face: normocephalic  Eyes/Vision: normal extraocular motion is intact  Nose/Mouth/Throat:mucous membranes are moist   Neck/Thyroid: neck is supple without adenopathy  Respiratory:  lungs are clear to auscultation bilaterally  Cardiovascular: regular rate and rhythm   Abdomen: soft, non-tender, non-distended, BS normal  Skin/Hair: no unusual rashes present, no abnormal bruising noted  Musculoskeletal: no deformities  Extremities: no edema  Neurological:  Grossly normal      Lab Results   Component Value Date     11/17/2022     09/13/2022     11/30/2021    K 4.5 11/17/2022    K 4.4 09/13/2022    K 4.5 11/30/2021     11/17/2022     09/13/2022     11/30/2021    CO2 25.0 11/17/2022    CO2 26.0 09/13/2022    CO2 28.0 11/30/2021    BUN 15 11/17/2022    BUN 20 (H) 09/13/2022    BUN 15 11/30/2021    CREATSERUM 1.28 (H) 11/17/2022    CREATSERUM 1.23 (H) 09/13/2022    CREATSERUM 1.23 (H) 11/30/2021    CA 9.3 11/17/2022    CA 9.0 09/13/2022    CA 8.8 11/30/2021    EGFRCR 49 (L) 11/17/2022    EGFRCR 51 (L) 09/13/2022    ALB 3.8 09/13/2022    ALB 3.3 (L) 11/30/2021    PHOS 2.8 09/13/2022    PHOS 2.9 11/30/2021    PTH 99.6 (H) 11/30/2021          ASSESSMENT/PLAN:   Assessment   1. Stage 3 chronic kidney disease, unspecified whether stage 3a or 3b CKD ([facility]  - RENAL FUNCTION PANEL; Future  - CBC WITH DIFFERENTIAL WITH PLATELET; Future  - PTH, INTACT; Future  - URINE PROTEIN/CREATININE RATIO, RANDOM; Future    2. Acquired solitary kidney    3.  Secondary hyperparathyroidism of renal origin ([facility]    4. Class 3 severe obesity with body mass index (BMI) of 40.0 to 44.9 in adult, unspecified obesity type, unspecified whether serious comorbidity present ([facility] CKD 3  CKD sec to solitary kidney. Kidney US showed fairly well maintained R kidney size . No proteinuria noted . Reiterated she avoid use of OTC NSAIDs( Advil , Aleve, Ibuprofen etc ) & OTC PPIs ( Omeprazole etc ) due to their potential nephrotoxicity. At high risk of CKD progression given morbid obesity & potential development of HTN . The patient verbalized an understanding of the discussion. Solitary right kidney  S/p left nephrectomy ( traumatic injury ) remote . No records available to me. Secondary HPTH  Monitor ca/phosp, ipth    Morbid obesity  Counseled on weight reduction & lifestyle modification again.    Appt today with dietician    PLAN  Reviewed [facility] labs  Cr stable  Trying to lose weight  Fu in 1 yr           Orders This Visit:  Orders Placed This Encounter      Renal Function Panel      CBC With Differential With Platelet      PTH, Intact      Protein/Creatinine Ratio, Urine Random      Meds This Visit:  Requested Prescriptions      No prescriptions requested or ordered in this encounter       Imaging & Referrals:  None       Kay Zamora MD  7/17/2023

## 2023-08-14 ENCOUNTER — E-ADVICE (OUTPATIENT)
Dept: BARIATRICS/WEIGHT MGMT | Age: 58
End: 2023-08-14

## 2023-08-18 ENCOUNTER — E-ADVICE (OUTPATIENT)
Dept: BARIATRICS/WEIGHT MGMT | Age: 58
End: 2023-08-18

## 2023-08-22 ENCOUNTER — OFFICE VISIT (OUTPATIENT)
Dept: SURGERY | Facility: CLINIC | Age: 58
End: 2023-08-22
Payer: MEDICARE

## 2023-08-22 VITALS
HEART RATE: 87 BPM | SYSTOLIC BLOOD PRESSURE: 102 MMHG | WEIGHT: 271 LBS | BODY MASS INDEX: 44.07 KG/M2 | HEIGHT: 65.7 IN | DIASTOLIC BLOOD PRESSURE: 80 MMHG | OXYGEN SATURATION: 94 %

## 2023-08-22 DIAGNOSIS — E66.01 MORBID OBESITY WITH BMI OF 40.0-44.9, ADULT (HCC): ICD-10-CM

## 2023-08-22 DIAGNOSIS — M15.9 PRIMARY OSTEOARTHRITIS INVOLVING MULTIPLE JOINTS: Primary | ICD-10-CM

## 2023-08-22 DIAGNOSIS — F43.9 STRESS: ICD-10-CM

## 2023-08-22 PROBLEM — M15.0 PRIMARY OSTEOARTHRITIS INVOLVING MULTIPLE JOINTS: Status: ACTIVE | Noted: 2023-08-22

## 2023-08-22 PROCEDURE — 99215 OFFICE O/P EST HI 40 MIN: CPT | Performed by: INTERNAL MEDICINE

## 2023-09-18 ENCOUNTER — OFFICE VISIT (OUTPATIENT)
Dept: SURGERY | Facility: CLINIC | Age: 58
End: 2023-09-18
Payer: MEDICARE

## 2023-09-18 VITALS
WEIGHT: 274 LBS | DIASTOLIC BLOOD PRESSURE: 80 MMHG | SYSTOLIC BLOOD PRESSURE: 136 MMHG | HEIGHT: 65.7 IN | OXYGEN SATURATION: 96 % | BODY MASS INDEX: 44.56 KG/M2 | HEART RATE: 87 BPM

## 2023-09-18 DIAGNOSIS — M15.9 PRIMARY OSTEOARTHRITIS INVOLVING MULTIPLE JOINTS: Primary | ICD-10-CM

## 2023-09-18 DIAGNOSIS — E03.9 HYPOTHYROIDISM, UNSPECIFIED TYPE: ICD-10-CM

## 2023-09-18 DIAGNOSIS — F41.9 ANXIETY AND DEPRESSION: ICD-10-CM

## 2023-09-18 DIAGNOSIS — F32.A ANXIETY AND DEPRESSION: ICD-10-CM

## 2023-09-18 DIAGNOSIS — F43.9 STRESS: ICD-10-CM

## 2023-09-18 DIAGNOSIS — E66.01 MORBID OBESITY WITH BMI OF 40.0-44.9, ADULT (HCC): ICD-10-CM

## 2023-09-18 DIAGNOSIS — N18.9 CHRONIC KIDNEY DISEASE, UNSPECIFIED CKD STAGE: ICD-10-CM

## 2023-09-18 PROCEDURE — 99213 OFFICE O/P EST LOW 20 MIN: CPT | Performed by: NURSE PRACTITIONER

## 2023-10-18 ENCOUNTER — OFFICE VISIT (OUTPATIENT)
Dept: SURGERY | Facility: CLINIC | Age: 58
End: 2023-10-18
Payer: MEDICARE

## 2023-10-18 VITALS — WEIGHT: 277.31 LBS | BODY MASS INDEX: 45.1 KG/M2 | HEIGHT: 65.7 IN

## 2023-10-18 DIAGNOSIS — E66.01 MORBID OBESITY WITH BMI OF 40.0-44.9, ADULT (HCC): ICD-10-CM

## 2023-10-18 DIAGNOSIS — N18.9 CHRONIC KIDNEY DISEASE, UNSPECIFIED CKD STAGE: Primary | ICD-10-CM

## 2023-10-18 PROCEDURE — 97802 MEDICAL NUTRITION INDIV IN: CPT | Performed by: DIETITIAN, REGISTERED

## 2023-10-18 NOTE — PATIENT INSTRUCTIONS
Goals:   1) Aim for 70-85g of protein per day  2) Aim for <120g of carbohydrates per day  3) Try to incorporate protein and produce every time you eat  4) Try oikos triple zero greek yogurt   For plain greek yogurt, choose nonfat  5) Try hearts of palm palmini pasta  6) Choose protein rich snacks: string cheese, cottage cheese, greek yogurt, HB eggs  7) Try cauliflower rice or zucchini noodles  8) Try unsweetened almond milk  9) Follow MyPlate method for building a healthy plate

## 2023-10-26 ENCOUNTER — OFFICE VISIT (OUTPATIENT)
Dept: SURGERY | Facility: CLINIC | Age: 58
End: 2023-10-26

## 2023-10-26 VITALS
BODY MASS INDEX: 44.56 KG/M2 | OXYGEN SATURATION: 97 % | DIASTOLIC BLOOD PRESSURE: 70 MMHG | WEIGHT: 274 LBS | HEART RATE: 100 BPM | HEIGHT: 65.7 IN | SYSTOLIC BLOOD PRESSURE: 112 MMHG

## 2023-10-26 DIAGNOSIS — E66.01 MORBID OBESITY WITH BMI OF 40.0-44.9, ADULT (HCC): ICD-10-CM

## 2023-10-26 DIAGNOSIS — F41.9 ANXIETY AND DEPRESSION: ICD-10-CM

## 2023-10-26 DIAGNOSIS — N18.9 CHRONIC KIDNEY DISEASE, UNSPECIFIED CKD STAGE: Primary | ICD-10-CM

## 2023-10-26 DIAGNOSIS — M15.9 PRIMARY OSTEOARTHRITIS INVOLVING MULTIPLE JOINTS: ICD-10-CM

## 2023-10-26 DIAGNOSIS — F32.A ANXIETY AND DEPRESSION: ICD-10-CM

## 2023-10-26 DIAGNOSIS — F43.9 STRESS: ICD-10-CM

## 2023-10-26 DIAGNOSIS — E03.9 HYPOTHYROIDISM, UNSPECIFIED TYPE: ICD-10-CM

## 2023-10-26 PROCEDURE — 99213 OFFICE O/P EST LOW 20 MIN: CPT | Performed by: NURSE PRACTITIONER

## 2023-11-08 ENCOUNTER — TELEPHONE (OUTPATIENT)
Dept: BARIATRICS/WEIGHT MGMT | Age: 58
End: 2023-11-08

## 2023-11-27 ENCOUNTER — OFFICE VISIT (OUTPATIENT)
Dept: SURGERY | Facility: CLINIC | Age: 58
End: 2023-11-27
Payer: MEDICARE

## 2023-11-27 VITALS
BODY MASS INDEX: 44.73 KG/M2 | DIASTOLIC BLOOD PRESSURE: 80 MMHG | SYSTOLIC BLOOD PRESSURE: 132 MMHG | WEIGHT: 275 LBS | OXYGEN SATURATION: 98 % | HEART RATE: 91 BPM | HEIGHT: 65.7 IN

## 2023-11-27 DIAGNOSIS — F43.9 STRESS: ICD-10-CM

## 2023-11-27 DIAGNOSIS — F41.9 ANXIETY AND DEPRESSION: ICD-10-CM

## 2023-11-27 DIAGNOSIS — M15.9 PRIMARY OSTEOARTHRITIS INVOLVING MULTIPLE JOINTS: ICD-10-CM

## 2023-11-27 DIAGNOSIS — E66.01 MORBID OBESITY WITH BMI OF 40.0-44.9, ADULT (HCC): ICD-10-CM

## 2023-11-27 DIAGNOSIS — F32.A ANXIETY AND DEPRESSION: ICD-10-CM

## 2023-11-27 DIAGNOSIS — E03.9 HYPOTHYROIDISM, UNSPECIFIED TYPE: ICD-10-CM

## 2023-11-27 DIAGNOSIS — N18.31 STAGE 3A CHRONIC KIDNEY DISEASE (HCC): Primary | ICD-10-CM

## 2023-11-27 RX ORDER — TOPIRAMATE 25 MG/1
25 TABLET ORAL 2 TIMES DAILY
Qty: 60 TABLET | Refills: 3 | Status: SHIPPED | OUTPATIENT
Start: 2023-11-27 | End: 2023-11-27

## 2023-11-30 ENCOUNTER — E-ADVICE (OUTPATIENT)
Dept: BARIATRICS/WEIGHT MGMT | Age: 58
End: 2023-11-30

## 2023-11-30 ENCOUNTER — APPOINTMENT (OUTPATIENT)
Dept: BARIATRICS/WEIGHT MGMT | Age: 58
End: 2023-11-30

## 2023-11-30 VITALS
BODY MASS INDEX: 44.68 KG/M2 | HEART RATE: 84 BPM | WEIGHT: 278 LBS | HEIGHT: 66 IN | DIASTOLIC BLOOD PRESSURE: 87 MMHG | TEMPERATURE: 98.4 F | SYSTOLIC BLOOD PRESSURE: 131 MMHG | OXYGEN SATURATION: 94 %

## 2023-11-30 DIAGNOSIS — G89.29 CHRONIC PAIN OF LEFT KNEE: Chronic | ICD-10-CM

## 2023-11-30 DIAGNOSIS — E66.01 MORBID OBESITY (CMD): Chronic | ICD-10-CM

## 2023-11-30 DIAGNOSIS — E78.2 MIXED HYPERLIPIDEMIA: Chronic | ICD-10-CM

## 2023-11-30 DIAGNOSIS — E03.9 ACQUIRED HYPOTHYROIDISM: ICD-10-CM

## 2023-11-30 DIAGNOSIS — F41.9 ANXIETY AND DEPRESSION: ICD-10-CM

## 2023-11-30 DIAGNOSIS — N18.31 STAGE 3A CHRONIC KIDNEY DISEASE (CMD): ICD-10-CM

## 2023-11-30 DIAGNOSIS — M54.50 CHRONIC BILATERAL LOW BACK PAIN WITHOUT SCIATICA: Chronic | ICD-10-CM

## 2023-11-30 DIAGNOSIS — K80.20 CALCULUS OF GALLBLADDER WITHOUT CHOLECYSTITIS WITHOUT OBSTRUCTION: Chronic | ICD-10-CM

## 2023-11-30 DIAGNOSIS — G47.9 SLEEP DISTURBANCE: Chronic | ICD-10-CM

## 2023-11-30 DIAGNOSIS — K45.8 HERNIA OF FLANK: Chronic | ICD-10-CM

## 2023-11-30 DIAGNOSIS — F32.A ANXIETY AND DEPRESSION: ICD-10-CM

## 2023-11-30 DIAGNOSIS — M25.562 CHRONIC PAIN OF LEFT KNEE: Chronic | ICD-10-CM

## 2023-11-30 DIAGNOSIS — M25.551 RIGHT HIP PAIN: Chronic | ICD-10-CM

## 2023-11-30 DIAGNOSIS — G89.29 CHRONIC BILATERAL LOW BACK PAIN WITHOUT SCIATICA: Chronic | ICD-10-CM

## 2023-11-30 DIAGNOSIS — E66.01 CLASS 3 SEVERE OBESITY DUE TO EXCESS CALORIES WITHOUT SERIOUS COMORBIDITY WITH BODY MASS INDEX (BMI) OF 40.0 TO 44.9 IN ADULT (CMD): ICD-10-CM

## 2023-11-30 PROBLEM — N18.9 CHRONIC KIDNEY DISEASE: Status: ACTIVE | Noted: 2023-07-17

## 2023-11-30 PROCEDURE — 99205 OFFICE O/P NEW HI 60 MIN: CPT | Performed by: SURGERY

## 2023-11-30 RX ORDER — LEVOTHYROXINE SODIUM 0.12 MG/1
TABLET ORAL
COMMUNITY
Start: 2023-11-28

## 2023-11-30 RX ORDER — NYSTATIN 100000 U/G
CREAM TOPICAL
COMMUNITY

## 2023-11-30 RX ORDER — BUSPIRONE HYDROCHLORIDE 10 MG/1
10 TABLET ORAL
COMMUNITY
End: 2023-11-30 | Stop reason: SDUPTHER

## 2023-11-30 RX ORDER — DULOXETIN HYDROCHLORIDE 30 MG/1
30 CAPSULE, DELAYED RELEASE ORAL
COMMUNITY

## 2023-11-30 RX ORDER — DULOXETIN HYDROCHLORIDE 60 MG/1
CAPSULE, DELAYED RELEASE ORAL
COMMUNITY
Start: 2023-11-03

## 2023-11-30 RX ORDER — BUSPIRONE HYDROCHLORIDE 10 MG/1
10 TABLET ORAL AT BEDTIME
COMMUNITY
Start: 2023-11-03

## 2023-11-30 RX ORDER — DULOXETIN HYDROCHLORIDE 30 MG/1
30 CAPSULE, DELAYED RELEASE ORAL EVERY MORNING
COMMUNITY
Start: 2023-11-03 | End: 2023-11-30 | Stop reason: SDUPTHER

## 2023-11-30 ASSESSMENT — ENCOUNTER SYMPTOMS
VOMITING: 0
RESPIRATORY NEGATIVE: 1
LIGHT-HEADEDNESS: 0
CHEST TIGHTNESS: 0
DIZZINESS: 0
SLEEP DISTURBANCE: 0
BACK PAIN: 1
CONSTIPATION: 0
COUGH: 0
NERVOUS/ANXIOUS: 1
CHILLS: 0
NAUSEA: 0
SEIZURES: 0
FATIGUE: 1
SHORTNESS OF BREATH: 0
FEVER: 0
DIARRHEA: 1
ROS GI COMMENTS: GERD NONE

## 2023-12-07 ENCOUNTER — TELEPHONE (OUTPATIENT)
Dept: SURGERY | Facility: CLINIC | Age: 58
End: 2023-12-07

## 2023-12-07 ENCOUNTER — TELEPHONE (OUTPATIENT)
Facility: CLINIC | Age: 58
End: 2023-12-07

## 2023-12-07 NOTE — TELEPHONE ENCOUNTER
I received a fax from Dr Debbi Silva ( [facility])   Needs renal clearance before gastric sleeve  thanks

## 2023-12-07 NOTE — TELEPHONE ENCOUNTER
Patient canceled appt with you on 12/22 she is not moving forward with sx. She also has some concerns/? about taking the topiratmate.  Patient aware you are out until Friday 12/8 please advise

## 2023-12-07 NOTE — TELEPHONE ENCOUNTER
Hi, do you want me to generate a note and fax to them stating pt is cleared? Or you signed papers that need to be faxed back to them? I'll come grab the fax so I know where to send it.

## 2024-01-10 ENCOUNTER — APPOINTMENT (OUTPATIENT)
Dept: BARIATRICS/WEIGHT MGMT | Age: 59
End: 2024-01-10

## 2024-02-03 ENCOUNTER — WALK IN (OUTPATIENT)
Dept: URGENT CARE | Age: 59
End: 2024-02-03

## 2024-02-03 VITALS
DIASTOLIC BLOOD PRESSURE: 74 MMHG | TEMPERATURE: 97.8 F | RESPIRATION RATE: 18 BRPM | HEART RATE: 87 BPM | OXYGEN SATURATION: 95 % | SYSTOLIC BLOOD PRESSURE: 116 MMHG

## 2024-02-03 DIAGNOSIS — J01.00 ACUTE NON-RECURRENT MAXILLARY SINUSITIS: Primary | ICD-10-CM

## 2024-02-03 PROBLEM — M25.511 PAIN IN RIGHT SHOULDER: Status: ACTIVE | Noted: 2019-04-01

## 2024-02-03 PROBLEM — B35.6 TINEA CRURIS: Status: ACTIVE | Noted: 2024-02-03

## 2024-02-03 PROBLEM — M76.821 POSTERIOR TIBIAL TENDINITIS OF RIGHT LOWER EXTREMITY: Status: ACTIVE | Noted: 2022-11-04

## 2024-02-03 PROBLEM — D17.1 LIPOMA OF SKIN AND SUBCUTANEOUS TISSUE OF TRUNK: Status: ACTIVE | Noted: 2024-02-03

## 2024-02-03 PROBLEM — M15.9 PRIMARY OSTEOARTHRITIS INVOLVING MULTIPLE JOINTS: Status: ACTIVE | Noted: 2023-08-22

## 2024-02-03 PROBLEM — M20.5X1 HALLUX LIMITUS OF RIGHT FOOT: Status: ACTIVE | Noted: 2023-11-06

## 2024-02-03 PROBLEM — M54.2 NECK PAIN ON RIGHT SIDE: Status: ACTIVE | Noted: 2019-04-01

## 2024-02-03 PROBLEM — K63.5 MULTIPLE POLYPS OF SIGMOID COLON: Status: ACTIVE | Noted: 2023-05-06

## 2024-02-03 PROBLEM — Z98.1 S/P CERVICAL SPINAL FUSION: Status: ACTIVE | Noted: 2019-04-01

## 2024-02-03 PROBLEM — K45.8: Status: ACTIVE | Noted: 2022-10-31

## 2024-02-03 PROBLEM — S46.011A TRAUMATIC COMPLETE TEAR OF RIGHT ROTATOR CUFF: Status: ACTIVE | Noted: 2019-05-16

## 2024-02-03 PROBLEM — M67.911 DYSFUNCTION OF RIGHT ROTATOR CUFF: Status: ACTIVE | Noted: 2019-04-01

## 2024-02-03 PROBLEM — F43.9 STRESS: Status: ACTIVE | Noted: 2023-08-22

## 2024-02-03 PROBLEM — N20.0 KIDNEY STONE ON RIGHT SIDE: Status: ACTIVE | Noted: 2017-05-23

## 2024-02-03 PROBLEM — M95.8 OSTEOCHONDRAL DEFECT OF TALUS: Status: ACTIVE | Noted: 2023-01-06

## 2024-02-03 PROBLEM — N89.8 PRURITUS OF VAGINA: Status: ACTIVE | Noted: 2024-02-03

## 2024-02-03 PROBLEM — Z98.890: Status: ACTIVE | Noted: 2023-06-21

## 2024-02-03 PROBLEM — E78.9 DISORDER OF LIPID METABOLISM: Status: ACTIVE | Noted: 2024-02-03

## 2024-02-03 PROBLEM — M75.121 NONTRAUMATIC COMPLETE TEAR OF RIGHT ROTATOR CUFF: Status: ACTIVE | Noted: 2019-05-01

## 2024-02-03 PROBLEM — M25.371 INSTABILITY OF RIGHT ANKLE JOINT: Status: ACTIVE | Noted: 2023-01-06

## 2024-02-03 PROBLEM — M25.571 SINUS TARSI SYNDROME OF RIGHT ANKLE: Status: ACTIVE | Noted: 2022-11-04

## 2024-02-03 LAB
S PYO DNA THROAT QL NAA+PROBE: NOT DETECTED
TEST LOT EXPIRATION DATE: NORMAL
TEST LOT NUMBER: NORMAL

## 2024-02-03 RX ORDER — BENZONATATE 200 MG/1
200 CAPSULE ORAL 3 TIMES DAILY PRN
Qty: 20 CAPSULE | Refills: 0 | Status: SHIPPED | OUTPATIENT
Start: 2024-02-03 | End: 2024-02-13

## 2024-02-03 RX ORDER — PANTOPRAZOLE SODIUM 40 MG/1
40 TABLET, DELAYED RELEASE ORAL
COMMUNITY

## 2024-02-03 RX ORDER — FLUTICASONE PROPIONATE 50 MCG
2 SPRAY, SUSPENSION (ML) NASAL DAILY
Qty: 48 G | Refills: 0 | Status: SHIPPED | OUTPATIENT
Start: 2024-02-03

## 2024-02-03 RX ORDER — LEVOCETIRIZINE DIHYDROCHLORIDE 5 MG/1
5 TABLET, FILM COATED ORAL EVERY EVENING
Qty: 90 TABLET | Refills: 0 | Status: SHIPPED | OUTPATIENT
Start: 2024-02-03 | End: 2024-05-03

## 2024-02-03 RX ORDER — TOPIRAMATE 25 MG/1
TABLET ORAL
COMMUNITY
Start: 2023-11-28

## 2024-02-03 RX ORDER — TRIAMCINOLONE ACETONIDE 1 MG/G
1 CREAM TOPICAL 2 TIMES DAILY
COMMUNITY

## 2024-02-03 RX ORDER — LIDOCAINE 50 MG/G
1 PATCH TOPICAL EVERY 24 HOURS
COMMUNITY

## 2024-02-03 RX ORDER — PREGABALIN 50 MG/1
50 CAPSULE ORAL
COMMUNITY

## 2024-02-03 ASSESSMENT — ENCOUNTER SYMPTOMS
SHORTNESS OF BREATH: 0
STRIDOR: 0
WHEEZING: 0
DIZZINESS: 0
HEADACHES: 0
GASTROINTESTINAL NEGATIVE: 1
APPETITE CHANGE: 1
EYE ITCHING: 0
VOMITING: 0
ACTIVITY CHANGE: 1
VOICE CHANGE: 0
NEUROLOGICAL NEGATIVE: 1
PSYCHIATRIC NEGATIVE: 1
PHOTOPHOBIA: 0
SINUS PRESSURE: 1
CHEST TIGHTNESS: 0
HEMATOLOGIC/LYMPHATIC NEGATIVE: 1
NAUSEA: 0
EYE PAIN: 0
DIARRHEA: 0
APNEA: 0
TROUBLE SWALLOWING: 1
BACK PAIN: 0
EYE REDNESS: 0
FEVER: 0
FATIGUE: 1
COUGH: 1
RHINORRHEA: 1
CHOKING: 0
CHILLS: 1
EYE DISCHARGE: 0
LIGHT-HEADEDNESS: 0
SORE THROAT: 1
ABDOMINAL PAIN: 0
EYES NEGATIVE: 1

## 2024-02-03 ASSESSMENT — PAIN SCALES - GENERAL: PAINLEVEL: 10

## 2024-03-12 ENCOUNTER — OFFICE VISIT (OUTPATIENT)
Facility: CLINIC | Age: 59
End: 2024-03-12

## 2024-03-12 ENCOUNTER — TELEPHONE (OUTPATIENT)
Facility: CLINIC | Age: 59
End: 2024-03-12

## 2024-03-12 ENCOUNTER — LAB ENCOUNTER (OUTPATIENT)
Dept: LAB | Facility: HOSPITAL | Age: 59
End: 2024-03-12
Attending: INTERNAL MEDICINE
Payer: MEDICARE

## 2024-03-12 VITALS
SYSTOLIC BLOOD PRESSURE: 118 MMHG | BODY MASS INDEX: 45 KG/M2 | DIASTOLIC BLOOD PRESSURE: 80 MMHG | WEIGHT: 275 LBS | HEART RATE: 85 BPM

## 2024-03-12 DIAGNOSIS — N25.81 SECONDARY HYPERPARATHYROIDISM OF RENAL ORIGIN (HCC): ICD-10-CM

## 2024-03-12 DIAGNOSIS — E66.01 CLASS 3 SEVERE OBESITY WITH BODY MASS INDEX (BMI) OF 40.0 TO 44.9 IN ADULT, UNSPECIFIED OBESITY TYPE, UNSPECIFIED WHETHER SERIOUS COMORBIDITY PRESENT (HCC): ICD-10-CM

## 2024-03-12 DIAGNOSIS — N18.30 STAGE 3 CHRONIC KIDNEY DISEASE, UNSPECIFIED WHETHER STAGE 3A OR 3B CKD (HCC): Primary | ICD-10-CM

## 2024-03-12 DIAGNOSIS — N18.30 STAGE 3 CHRONIC KIDNEY DISEASE, UNSPECIFIED WHETHER STAGE 3A OR 3B CKD (HCC): ICD-10-CM

## 2024-03-12 DIAGNOSIS — Z90.5 ACQUIRED SOLITARY KIDNEY: ICD-10-CM

## 2024-03-12 LAB
ALBUMIN SERPL-MCNC: 4.3 G/DL (ref 3.2–4.8)
ANION GAP SERPL CALC-SCNC: 7 MMOL/L (ref 0–18)
BASOPHILS # BLD AUTO: 0.06 X10(3) UL (ref 0–0.2)
BASOPHILS NFR BLD AUTO: 0.7 %
BUN BLD-MCNC: 21 MG/DL (ref 9–23)
BUN/CREAT SERPL: 16.4 (ref 10–20)
CALCIUM BLD-MCNC: 9.8 MG/DL (ref 8.7–10.4)
CHLORIDE SERPL-SCNC: 108 MMOL/L (ref 98–112)
CO2 SERPL-SCNC: 25 MMOL/L (ref 21–32)
CREAT BLD-MCNC: 1.28 MG/DL
CREAT UR-SCNC: 122 MG/DL
DEPRECATED RDW RBC AUTO: 42.3 FL (ref 35.1–46.3)
EGFRCR SERPLBLD CKD-EPI 2021: 48 ML/MIN/1.73M2 (ref 60–?)
EOSINOPHIL # BLD AUTO: 0.25 X10(3) UL (ref 0–0.7)
EOSINOPHIL NFR BLD AUTO: 2.9 %
ERYTHROCYTE [DISTWIDTH] IN BLOOD BY AUTOMATED COUNT: 12.8 % (ref 11–15)
GLUCOSE BLD-MCNC: 80 MG/DL (ref 70–99)
HCT VFR BLD AUTO: 41.8 %
HGB BLD-MCNC: 14.2 G/DL
IMM GRANULOCYTES # BLD AUTO: 0.03 X10(3) UL (ref 0–1)
IMM GRANULOCYTES NFR BLD: 0.4 %
LYMPHOCYTES # BLD AUTO: 2.35 X10(3) UL (ref 1–4)
LYMPHOCYTES NFR BLD AUTO: 27.5 %
MCH RBC QN AUTO: 30.8 PG (ref 26–34)
MCHC RBC AUTO-ENTMCNC: 34 G/DL (ref 31–37)
MCV RBC AUTO: 90.7 FL
MONOCYTES # BLD AUTO: 0.58 X10(3) UL (ref 0.1–1)
MONOCYTES NFR BLD AUTO: 6.8 %
NEUTROPHILS # BLD AUTO: 5.29 X10 (3) UL (ref 1.5–7.7)
NEUTROPHILS # BLD AUTO: 5.29 X10(3) UL (ref 1.5–7.7)
NEUTROPHILS NFR BLD AUTO: 61.7 %
OSMOLALITY SERPL CALC.SUM OF ELEC: 292 MOSM/KG (ref 275–295)
PHOSPHATE SERPL-MCNC: 3.3 MG/DL (ref 2.4–5.1)
PLATELET # BLD AUTO: 295 10(3)UL (ref 150–450)
POTASSIUM SERPL-SCNC: 4.6 MMOL/L (ref 3.5–5.1)
PROT UR-MCNC: <6 MG/DL (ref ?–14)
PTH-INTACT SERPL-MCNC: 75 PG/ML (ref 18.5–88)
RBC # BLD AUTO: 4.61 X10(6)UL
SODIUM SERPL-SCNC: 140 MMOL/L (ref 136–145)
WBC # BLD AUTO: 8.6 X10(3) UL (ref 4–11)

## 2024-03-12 PROCEDURE — 85025 COMPLETE CBC W/AUTO DIFF WBC: CPT

## 2024-03-12 PROCEDURE — 82570 ASSAY OF URINE CREATININE: CPT

## 2024-03-12 PROCEDURE — 84156 ASSAY OF PROTEIN URINE: CPT

## 2024-03-12 PROCEDURE — 36415 COLL VENOUS BLD VENIPUNCTURE: CPT

## 2024-03-12 PROCEDURE — 83970 ASSAY OF PARATHORMONE: CPT

## 2024-03-12 PROCEDURE — 99214 OFFICE O/P EST MOD 30 MIN: CPT | Performed by: INTERNAL MEDICINE

## 2024-03-12 PROCEDURE — 80069 RENAL FUNCTION PANEL: CPT

## 2024-03-12 NOTE — PROGRESS NOTES
[facility]    Progress Note     Codie Andrade  59 yr old lady here for follow up.  Known to have solitary right kidney ( s/p left nephrectomy following trauma ) with CKD  PMH HLD ( intolerance to most medications ) , Obesity & Nephrolithiasis ( remote h/o R kidney calculus)  . No h/o long term or recent OTC NSAID use No family h/o kidney disease   NO new issues  trying to lose weight. No longer considering bariatric surg  Feels tired on an off  No cp or sob  Good BP control      HISTORY:  Past Medical History:   Diagnosis Date    Anxiety     Arthritis     neck and back    Depression     Disorder of thyroid       Past Surgical History:   Procedure Laterality Date    ADDL NECK SPINE FUSION      ANKLE FRACTURE SURGERY      L ankle sx    COLONOSCOPY N/A 4/28/2023    Procedure: COLONOSCOPY;  Surgeon: Alfred Arreola MD;  Location: [facility] ENDOSCOPY    NEPHRECTOMY Left            Medications (Active prior to today's visit):  Current Outpatient Medications   Medication Sig Dispense Refill    DULoxetine 30 MG Oral Cap DR Particles Take 1 capsule (30 mg total) by mouth daily.      busPIRone 10 MG Oral Tab Take 1 tablet (10 mg total) by mouth at bedtime.      Levothyroxine Sodium 125 MCG Oral Tab Take 1 tablet (125 mcg total) by mouth before breakfast.      nystatin 661479 UNIT/GM External Cream Apply topically.         Allergies:  No Known Allergies      ROS:     Constitutional:  Negative for decreased activity, fever, irritability and lethargy  ENMT:  Negative for ear drainage, hearing loss and nasal drainage  Eyes:  Negative for eye discharge and vision loss  Cardiovascular:  Negative for chest pain, sob  Respiratory:  Negative for cough, dyspnea and wheezing  Gastrointestinal:  Negative for abdominal pain, constipation  Genitourinary:  Negative for dysuria and hematuria  Endocrine:  Negative for abnormal sleep patterns  Hema/Lymph:  Negative for easy bleeding and easy bruising  Integumentary:  Negative  for pruritus and rash  Musculoskeletal:  Negative for bone/joint symptoms  Neurological:  Negative for gait disturbance  Psychiatric:  Negative for inappropriate interaction and psychiatric symptoms      Vitals:    03/12/24 1038   BP: 118/80   Pulse: 85       PHYSICAL EXAM:   Constitutional: appears well hydrated alert and responsive   Head/Face: normocephalic  Eyes/Vision: normal extraocular motion is intact  Nose/Mouth/Throat:mucous membranes are moist   Neck/Thyroid: neck is supple without adenopathy  Respiratory:  lungs are clear to auscultation bilaterally  Cardiovascular: regular rate and rhythm   Abdomen: soft, non-tender, non-distended, BS normal  Skin/Hair: no unusual rashes present, no abnormal bruising noted  Musculoskeletal: no deformities  Extremities: no edema  Neurological:  Grossly normal      Lab Results   Component Value Date     11/17/2022     09/13/2022     11/30/2021    K 4.5 11/17/2022    K 4.4 09/13/2022    K 4.5 11/30/2021     11/17/2022     09/13/2022     11/30/2021    CO2 25.0 11/17/2022    CO2 26.0 09/13/2022    CO2 28.0 11/30/2021    BUN 15 11/17/2022    BUN 20 (H) 09/13/2022    BUN 15 11/30/2021    CREATSERUM 1.28 (H) 11/17/2022    CREATSERUM 1.23 (H) 09/13/2022    CREATSERUM 1.23 (H) 11/30/2021    CA 9.3 11/17/2022    CA 9.0 09/13/2022    CA 8.8 11/30/2021    EGFRCR 49 (L) 11/17/2022    EGFRCR 51 (L) 09/13/2022    ALB 3.8 09/13/2022    ALB 3.3 (L) 11/30/2021    PHOS 2.8 09/13/2022    PHOS 2.9 11/30/2021    PTH 99.6 (H) 11/30/2021          ASSESSMENT/PLAN:   Assessment   1. Stage 3 chronic kidney disease, unspecified whether stage 3a or 3b CKD (HCC)    2. Acquired solitary kidney    3. Secondary hyperparathyroidism of renal origin (HCC)    4. Class 3 severe obesity with body mass index (BMI) of 40.0 to 44.9 in adult, unspecified obesity type, unspecified whether serious comorbidity present (HCC)         CKD 3  CKD sec to solitary kidney.  Kidney US  showed fairly well maintained R kidney size . No proteinuria noted . Reiterated she avoid use of OTC NSAIDs( Advil , Aleve, Ibuprofen etc ) & OTC PPIs ( Omeprazole etc ) due to their potential nephrotoxicity. At high risk of CKD progression given morbid obesity & potential development of HTN .  The patient verbalized an understanding of the discussion.    Solitary right kidney  S/p left nephrectomy ( traumatic injury ) remote . No records available to me.    Secondary HPTH  Monitor ca/phosp, ipth    Morbid obesity  Counseled on weight reduction & lifestyle modification again.   Can try mounjaro or ozempic ( GLP1 agonist ) for weight loss  Independednt risk factor for CKD progression    PLAN  Reviewed July labs- cr ok  Labs  today and will call with the results  Ok to use Mounjaro or Ozempic (GLP1) agonist to lose weight           Orders This Visit:  No orders of the defined types were placed in this encounter.      Meds This Visit:  Requested Prescriptions      No prescriptions requested or ordered in this encounter       Imaging & Referrals:  None       Katerina Ferrer MD  3/12/2024

## 2024-03-12 NOTE — TELEPHONE ENCOUNTER
Tried calling pt, did not   Labs done today- kidney function is stable  Same as last yr  Fu in dec with preclinic labs  thanks

## 2024-06-12 ENCOUNTER — ANESTHESIA (OUTPATIENT)
Dept: ENDOSCOPY | Facility: HOSPITAL | Age: 59
End: 2024-06-12
Payer: MEDICARE

## 2024-06-12 ENCOUNTER — HOSPITAL ENCOUNTER (OUTPATIENT)
Facility: HOSPITAL | Age: 59
Setting detail: HOSPITAL OUTPATIENT SURGERY
Discharge: HOME | End: 2024-06-12
Attending: INTERNAL MEDICINE | Admitting: INTERNAL MEDICINE
Payer: MEDICARE

## 2024-06-12 ENCOUNTER — ANESTHESIA EVENT (OUTPATIENT)
Dept: ENDOSCOPY | Facility: HOSPITAL | Age: 59
End: 2024-06-12
Payer: MEDICARE

## 2024-06-12 VITALS
RESPIRATION RATE: 15 BRPM | WEIGHT: 270 LBS | HEIGHT: 68 IN | HEART RATE: 66 BPM | BODY MASS INDEX: 40.92 KG/M2 | OXYGEN SATURATION: 97 % | SYSTOLIC BLOOD PRESSURE: 141 MMHG | DIASTOLIC BLOOD PRESSURE: 77 MMHG

## 2024-06-12 DIAGNOSIS — R09.A2 GLOBUS SENSATION: ICD-10-CM

## 2024-06-12 DIAGNOSIS — D12.6 SERRATED POLYPOSIS SYNDROME: ICD-10-CM

## 2024-06-12 DIAGNOSIS — R15.0 INCOMPLETE DEFECATION: ICD-10-CM

## 2024-06-12 PROCEDURE — 0DB78ZX EXCISION OF STOMACH, PYLORUS, VIA NATURAL OR ARTIFICIAL OPENING ENDOSCOPIC, DIAGNOSTIC: ICD-10-PCS | Performed by: INTERNAL MEDICINE

## 2024-06-12 PROCEDURE — 0DBN8ZX EXCISION OF SIGMOID COLON, VIA NATURAL OR ARTIFICIAL OPENING ENDOSCOPIC, DIAGNOSTIC: ICD-10-PCS | Performed by: INTERNAL MEDICINE

## 2024-06-12 PROCEDURE — 0DB48ZX EXCISION OF ESOPHAGOGASTRIC JUNCTION, VIA NATURAL OR ARTIFICIAL OPENING ENDOSCOPIC, DIAGNOSTIC: ICD-10-PCS | Performed by: INTERNAL MEDICINE

## 2024-06-12 PROCEDURE — 0DBK8ZX EXCISION OF ASCENDING COLON, VIA NATURAL OR ARTIFICIAL OPENING ENDOSCOPIC, DIAGNOSTIC: ICD-10-PCS | Performed by: INTERNAL MEDICINE

## 2024-06-12 PROCEDURE — 88305 TISSUE EXAM BY PATHOLOGIST: CPT | Performed by: INTERNAL MEDICINE

## 2024-06-12 PROCEDURE — 0DBH8ZX EXCISION OF CECUM, VIA NATURAL OR ARTIFICIAL OPENING ENDOSCOPIC, DIAGNOSTIC: ICD-10-PCS | Performed by: INTERNAL MEDICINE

## 2024-06-12 PROCEDURE — 0DBP8ZX EXCISION OF RECTUM, VIA NATURAL OR ARTIFICIAL OPENING ENDOSCOPIC, DIAGNOSTIC: ICD-10-PCS | Performed by: INTERNAL MEDICINE

## 2024-06-12 PROCEDURE — 88312 SPECIAL STAINS GROUP 1: CPT | Performed by: INTERNAL MEDICINE

## 2024-06-12 RX ORDER — LIDOCAINE HYDROCHLORIDE 10 MG/ML
INJECTION, SOLUTION EPIDURAL; INFILTRATION; INTRACAUDAL; PERINEURAL AS NEEDED
Status: DISCONTINUED | OUTPATIENT
Start: 2024-06-12 | End: 2024-06-12 | Stop reason: SURG

## 2024-06-12 RX ORDER — SODIUM CHLORIDE, SODIUM LACTATE, POTASSIUM CHLORIDE, CALCIUM CHLORIDE 600; 310; 30; 20 MG/100ML; MG/100ML; MG/100ML; MG/100ML
INJECTION, SOLUTION INTRAVENOUS CONTINUOUS
Status: DISCONTINUED | OUTPATIENT
Start: 2024-06-12 | End: 2024-06-12

## 2024-06-12 RX ADMIN — SODIUM CHLORIDE, SODIUM LACTATE, POTASSIUM CHLORIDE, CALCIUM CHLORIDE: 600; 310; 30; 20 INJECTION, SOLUTION INTRAVENOUS at 10:36:00

## 2024-06-12 RX ADMIN — LIDOCAINE HYDROCHLORIDE 25 MG: 10 INJECTION, SOLUTION EPIDURAL; INFILTRATION; INTRACAUDAL; PERINEURAL at 10:35:00

## 2024-06-12 NOTE — ANESTHESIA POSTPROCEDURE EVALUATION
Patient: Codie Andrade    Procedure Summary       Date: 06/12/24 Room / Location: [facility] ENDOSCOPY 01 / [facility] ENDOSCOPY    Anesthesia Start: 1033 Anesthesia Stop: 1123    Procedures:       COLONOSCOPY/ESOPHAGOGASTRODUODENOSCOPY      ESOPHAGOGASTRODUODENOSCOPY (EGD) Diagnosis:       Serrated polyposis syndrome      Incomplete defecation      Globus sensation      (esophagitis, gastritis, duodenitis, hemorrhoids, polyps, diverticulosis)    Surgeons: Chanel Arvizu MD Anesthesiologist: Pam Reyes CRNA    Anesthesia Type: MAC ASA Status: 3            Anesthesia Type: No value filed.    Vitals Value Taken Time   /79 06/12/24 1123   Temp 98 06/12/24 1123   Pulse 78 06/12/24 1123   Resp 16 06/12/24 1123   SpO2 99 06/12/24 1123       [facility] AN Post Evaluation:   Patient Evaluated in PACU  Patient Participation: complete - patient participated  Level of Consciousness: awake  Pain Score: 0  Pain Management: adequate  Airway Patency:patent  Yes    Cardiovascular Status: acceptable  Respiratory Status: acceptable  Postoperative Hydration acceptable      Pam Reyes CRNA  6/12/2024 11:23 AM

## 2024-06-12 NOTE — DISCHARGE INSTRUCTIONS
ENDOSCOPY DISCHARGE INSTRUCTIONS    Procedure Performed:   Gastroscopy and Colonoscopy    Endoscopist: Chanel Arvizu MD  FINDINGS:   Esophagitis (inflamation of the esophagus lining), Hiatal hernia (stomach slides up into chest through diaphragm), Gastritis (inflammation of the stomach lining), Internal/external hemorrhoids, Diverticulosis (pockets in colon that develop with age and lack of fiber intake), and Colon polyp(s) (a growth in the colon) - 10 polyps found and removed    MEDICATIONS:  You may resume all other medications today - see below    DIET:  Regular - see below    BIOPSIES:  Biopsies were taken (you will be notified of results in 7-10 days)    X-RAYS:   No X-Rays were ordered today    Recommendations:   1.High Fiber diet:  30 grams of fiber a day (increase by 3 grams a week in the next 2-3 months) to decrease complications of diverticulosis  2.Changes to medications:  -Start an acid suppressing medication (over the counter omeprazole 20 mg):  Take 1 tablet by mouth 30 minutes before breakfast and 30 minutes before dinner daily for 8 weeks (please  tomorrow from your pharmacy)  -Avoid/limit NSAIDS (ibuprofen, aspirin, aleve, motrin, and others in the family) for one week to decrease postpolypectomy bleeding risk  -Avoid/limit aspirin and aspirin containing products for one week to decrease postpolypectomy bleeding risk  3.Repeat colonoscopy in 1 year due to your diagnosis of SPS  4.Call GI clinic in 7-10 days if you do not hear from regarding your biopsy results    Activity for remainder of today:    REST TODAY  DO NOT drive or operate heavy machinery  DO NOT drink any alcoholic beverages  DO NOT sign any legal documents or make any important decisions    After your procedure(s):  It is not unusual to feel bloated or gassy .  Passing gas and belching is encouraged. Lying on your left side with your knees flexed may relieve the discomfort. A hot pack to the abdomen may also help.    After your  gastroscopy (upper endoscopy): You may experience a slight sore throat which will subside. Throat lozenges or salt water gargle can be used.    FOLLOW-UP:  Contact the office at [phone] for follow-up appointment is needed or if you develop any of the following:    Severe abdominal pain/discomfort     Excessive bleeding                     Black tarry stool    Difficulty breathing/swallowing      Persistent nausea/vomiting  Fever above 100 degrees or chills        Home Care Instructions for Colonoscopy and/or Gastroscopy with Sedation    Diet:  - Resume your regular diet as tolerated unless otherwise instructed.  - Start with light meals to minimize bloating.  - Do not drink alcohol today.    Medication:  - If you have questions about resuming your normal medications, please contact your Primary Care Physician.    Activities:  - Take it easy today. Do not return to work today.  - Do not drive today.  - Do not operate any machinery today (including kitchen equipment).    Colonoscopy:  - You may notice some rectal \"spotting\" (a little blood on the toilet tissue) for a day or two after the exam. This is normal.  - If you experience any rectal bleeding (not spotting), persistent tenderness or sharp severe abdominal pains, oral temperature over 100 degrees Fahrenheit, light-headedness or dizziness, or any other problems, contact your doctor.    Gastroscopy:  - You may have a sore throat for 2-3 days following the exam. This is normal. Gargling with warm salt water (1/2 tsp salt to 1 glass warm water) or using throat lozenges will help.  - If you experience any sharp pain in your neck, abdomen or chest, vomiting of blood, oral temperature over 100 degrees Fahrenheit, light-headedness or dizziness, or any other problems, contact your doctor.    **If unable to reach your doctor, please go to the [facility] Emergency Room**    - Your referring physician will receive a full report of your examination.  - If you do  not hear from your doctor's office within two weeks of your biopsy, please call them for your results.    You may be able to see your laboratory results in [facility] between 4 and 7 business days.  In some cases, your physician may not have viewed the results before they are released to [facility].  If you have questions regarding your results contact the physician who ordered the test/exam by phone or via [employer] by choosing \"Ask a Medical Question.\"

## 2024-06-12 NOTE — ANESTHESIA PREPROCEDURE EVALUATION
Anesthesia PreOp Note    HPI:     Codie Andrade is a 59 year old female who presents for preoperative consultation requested by: Chanel Arvizu MD    Date of Surgery: 6/12/2024    Procedure(s):  COLONOSCOPY/ESOPHAGOGASTRODUODENOSCOPY  ESOPHAGOGASTRODUODENOSCOPY (EGD)  Indication: Serrated polyposis syndrome/Globus sensation/Serrated polyposis syndrome    Relevant Problems   No relevant active problems       NPO:                         History Review:  Patient Active Problem List    Diagnosis Date Noted   • Primary osteoarthritis involving multiple joints 08/22/2023   • Stress 08/22/2023   • Anxiety and depression 07/17/2023   • Morbid obesity with BMI of 40.0-44.9, adult (HCC) 07/17/2023   • Hypothyroidism 07/17/2023   • Chronic kidney disease 07/17/2023   • Aftercare following surgery of the musculoskeletal system 06/20/2019   • Acute pain of right shoulder 05/16/2019   • Traumatic complete tear of right rotator cuff, initial encounter 05/16/2019   • right supraspinatus complete full thickness tear, right subscapularis mild bursal surface tear 05/01/2019   • C6-7 disc degeneration 05/01/2019   • Chronic right shoulder pain 04/01/2019   • Neck pain on right side 04/01/2019   • Dysfunction of right rotator cuff 04/01/2019   • right > left C6 and right C7 radiculopathy 04/01/2019   • S/P cervical spinal fusion: C5-6 & C6-7 ACDF 04/01/2019       Past Medical History:   • Anxiety   • Arthritis    neck and back   • Depression   • Disorder of thyroid   • Renal disorder    left kidney removed       Past Surgical History:   Procedure Laterality Date   • Addl neck spine fusion     • Ankle fracture surgery      L ankle sx   • Colonoscopy N/A 4/28/2023    Procedure: COLONOSCOPY;  Surgeon: Alfred Arreola MD;  Location: [facility] ENDOSCOPY   • Nephrectomy Left        No medications prior to admission.     No current Epic-ordered facility-administered medications on file.     Current Outpatient Medications Ordered in Epic    Medication Sig Dispense Refill   • DULoxetine 30 MG Oral Cap DR Particles Take 1 capsule (30 mg total) by mouth daily.     • busPIRone 10 MG Oral Tab Take 1 tablet (10 mg total) by mouth at bedtime.     • Levothyroxine Sodium 125 MCG Oral Tab Take 1 tablet (125 mcg total) by mouth before breakfast.     • nystatin [id number] UNIT/GM External Cream Apply topically.         Allergies   Allergen Reactions   • Codeine UNKNOWN   • Ibuprofen UNKNOWN       No family history on file.  Social History     Socioeconomic History   • Marital status: Single   Tobacco Use   • Smoking status: Never   • Smokeless tobacco: Never   Vaping Use   • Vaping status: Never Used   Substance and Sexual Activity   • Alcohol use: No   • Drug use: No   • Sexual activity: Not Currently   Other Topics Concern   • Caffeine Concern Yes     Comment: Coffee: 1 cup daily   • Exercise No       Available pre-op labs reviewed.             Vital Signs:  Body mass index is 41.05 kg/m².   height is 1.727 m (5' 8\") and weight is 122.5 kg (270 lb).   Vitals:    06/03/24 1251   Weight: 122.5 kg (270 lb)   Height: 1.727 m (5' 8\")        Anesthesia Evaluation     Patient summary reviewed and Nursing notes reviewed    No history of anesthetic complications   Airway   Mallampati: III  Dental      Pulmonary    (-) COPD  Cardiovascular   (-) hypertension, past MI, CAD    Rate: normal    Neuro/Psych    (+)  neuromuscular disease, anxiety/panic attacks,  depression      GI/Hepatic/Renal    (+) chronic renal disease (CKD)    Comments: Morbid obesity    Endo/Other    (+) hypothyroidism    Comments: oesity  Abdominal   (+) obese               Anesthesia Plan:   ASA:  3  Plan:   MAC    I have informed Codie Andrade and/or legal guardian or family member of the nature of the anesthetic plan, benefits, risks including possible dental damage if relevant, major complications, and any alternative forms of anesthetic management.   All of the patient's questions were  answered to the best of my ability. The patient desires the anesthetic management as planned.  Pam Reyes CRNA  6/12/2024 9:37 AM  Present on Admission:  **None**

## 2024-06-12 NOTE — H&P
[facility]-Gastroenterology    Codie Andrade is a 59 year old female presenting for colonoscopy and endoscopy for polyp survey with dx of SPS, bloating, globus sensation    ROS:  No fevers, chills.  Denies nausea, vomiting. Denies abdominal pain.     History of obstructive sleep apnea: No  History of respiratory illness/home oxygen supplementation: No  History of cardiac illness/pacemaker/AICD/blood thinners: No  History of anxiety/depression: YES  History of chronic narcotic pain medication use: No  History of diabetes: No  History of mobility issues: No  History of C-Diff Colitis:No  History of MRSA: No  History of abdominal surgeries:  nephrectomy    ENDOSCOPIC REPORTS:   Colonoscopy:9/2023  Polyps x 10 - 8 ssp, 1 adenoma, 1 benign; two 1 cm - descending/hf with tattoo  Diverticulosis  Melanosis Coli  Grade 2 Internal Hemorrhoids   EGD:  ERCP:    History of Prep or Sedation intolerance with previous endoscopic procedures: None    Medication History including herbals, supplements, and otc:  No current facility-administered medications on file prior to encounter.     Current Outpatient Medications on File Prior to Encounter   Medication Sig Dispense Refill    DULoxetine 30 MG Oral Cap DR Particles Take 1 capsule (30 mg total) by mouth daily.      busPIRone 10 MG Oral Tab Take 1 tablet (10 mg total) by mouth at bedtime.      Levothyroxine Sodium 125 MCG Oral Tab Take 1 tablet (125 mcg total) by mouth before breakfast.      nystatin [id number] UNIT/GM External Cream Apply topically.          Allergies   Allergen Reactions    Codeine UNKNOWN    Ibuprofen UNKNOWN       Past Medical History:    Anxiety    Arthritis    neck and back    Depression    Disorder of thyroid    Renal disorder    left kidney removed       Past Surgical History:   Procedure Laterality Date    Addl neck spine fusion      Ankle fracture surgery      L ankle sx    Colonoscopy N/A 4/28/2023    Procedure: COLONOSCOPY;  Surgeon: Maxwell  MD Alfred;  Location: [facility] ENDOSCOPY    Nephrectomy Left        Social History     Socioeconomic History    Marital status: Single   Tobacco Use    Smoking status: Never    Smokeless tobacco: Never   Vaping Use    Vaping status: Never Used   Substance and Sexual Activity    Alcohol use: No    Drug use: No    Sexual activity: Not Currently   Other Topics Concern    Caffeine Concern Yes     Comment: Coffee: 1 cup daily    Exercise No       Family History of GI/Pancreas/Liver cancers or illnesses:  No hx of GI tract cancers, IBD, Celiac     No family history on file.     PHYSICAL EXAM:     General: well developed, well nourished, in no apparent distress  HEENT:  Conjunctiva clear; mucous membranes moist  Lungs: non-labored; CTAB  Abdomen: normoactive BS; soft; nontender, non-distended  Neuro: no gross deficit, gait intact  Skin: warm, dry and intact  MSK: moving all extremities; no deformity  Psych: appropriate mood/affect    ASSESSMENT AND PLAN:   Codie Andrade is a 59 year old female presenting for endoscopy and colonoscopy.    Serrated polyposis syndrome - reviewed findings, meets criteria for SPS, repeat at 6 months per recs but will likely need 1-3 year survey; advised to let sibling know she is high risk  - COLONOSCOPY DIAGNOSTIC - REFERRAL  - polyethylene glycol, PEG 3350-KCl-NaBcb-NaCl-NaSulf, 236 g Oral Recon Soln; Split dose    Globus sensation - ddx includes esophageal lesion/polyp, gerd related, eoe, and inlet patch - egd with bx to work up further  - UPPER GI ENDOSCOPY - REFERRAL    Incomplete defecation - likely source of bloating and lower abdominal discomfort - discussed need to treat to help improve sxs: fiber/miralax and increasing water intake. Follow up after procedure and reassess  - COLONOSCOPY DIAGNOSTIC - REFERRAL  - polyethylene glycol, PEG 3350-KCl-NaBcb-NaCl-NaSulf, 236 g Oral Recon Soln; Split dose     The risks and benefits of my recommendations, as well as other treatment options  were discussed with the patient today. Questions were answered. The patient indicates understanding of these issues and agrees to the plan.    Plan for egd/oc with possible biopsies and possible polypectomy.  The procedure, indications, and risks (including perforation, bleeding, infection, and sedation related complications) were discussed.

## 2024-06-12 NOTE — OPERATIVE REPORT
EGD/Colonoscopy Operative Report    Codie Andrade Patient Status:  Hospital Outpatient Surgery    1965 MRN [id number]   Location [facility] ENDOSCOPY LAB SUITES Attending Chanel Arvizu MD    Day #   0 PCP Diamante Gutierrez MD     Pre-Operative Diagnosis: Serrated polyposis syndrome/Globus sensation/Serrated polyposis syndrome     Post-Operative Diagnosis:    ESOPHAGUS:  sliding hiatal hernia of 2 cm, small 1 cm salmon tonuge - biopsies taken; esophagitis; otherwise normal   STOMACH:  gastritis, nodular - biopsies taken   DUODENUM:  mild bulbar duodenitis, otherwise normal   COLON:     1.Colon    -colon polyps x10:  small sessile cold snare:  cecal x1, ascending x1, hf x1, dc x3, sigmoid x2, rectal x2   -pan diverticulosis   -internal and external hemorrhoids    Procedure Performed:  EGD with biopsies  COLONOSCOPY with cold snare    Informed Consent: Informed consent for both the procedure and sedation were obtained from the patient. The potentially life-threatening complications of sedation, bleeding,  perforation, transfusion or repeat endoscopy were reviewed along with the possible need for hospitalization, surgical management, transfusion or repeat endoscopy should one of these complications arise. The patient understands and is agreeable to proceed.  Sedation Type: MAC-Patient received sedation with monitored anesthesia provided by an anesthesiologist    Cecum Withdrawal Time:  29 minutes    Date of previous colonoscopy:     Procedure Description: The patient was placed in the left lateral decubitus position. A bite block was placed in the patient’s mouth. The endoscope was inserted through the mouth and advanced under direct visualization to the descending duodenum and was then withdrawn to examine the duodenal bulb and gastric antrum.  The endoscope was then retroflexed to examine the angulus, GE junction, cardia, body and  fundus,  then withdrawn to examine the esophagus. The endoscope was then removed from the patient. The patient tolerated the procedure well with no immediate complications. The patient was then repositioned for colonoscopy.  After careful digital rectal examination, the Adult colonoscope was inserted into the rectum and advanced to the level of the cecum under direct visualization. The cecum was identified by landmarks, including the appendiceal orifice and ileoceccal valve. Careful examination of the entire colon was performed during withdrawal of the endoscope. The scope was withdrawn to the rectum and retroflexion was performed.  The patient tolerated the procedure well with no immediate complications. The patient was transferred to the recovery area in stable condition.   Quality of Preparation: Adequate  [place] Bowel Prep Scale: 2/2/2    Findings: see above  Recommendations:   1.High Fiber diet:  30 grams of fiber a day (increase by 3 grams a week in the next 2-3 months) to decrease complications of diverticulosis  2.Changes to medications:  -Start an acid suppressing medication (over the counter omeprazole 20 mg):  Take 1 tablet by mouth 30 minutes before breakfast and 30 minutes before dinner daily for 8 weeks (please  tomorrow from your pharmacy)  -Avoid/limit NSAIDS (ibuprofen, aspirin, aleve, motrin, and others in the family) for one week to decrease postpolypectomy bleeding risk  -Avoid/limit aspirin and aspirin containing products for one week to decrease postpolypectomy bleeding risk  3.Repeat colonoscopy in 1 year due to your diagnosis of SPS  4.Call GI clinic in 7-10 days if you do not hear from regarding your biopsy results  Discharge:  The patient was given an after visit summary detailing the procedure, findings, recommendations and follow up plans.    Chanel Arvizu MD  6/12/2024  10:43 AM

## 2025-01-10 ENCOUNTER — LAB ENCOUNTER (OUTPATIENT)
Dept: LAB | Facility: HOSPITAL | Age: 60
End: 2025-01-10
Attending: INTERNAL MEDICINE
Payer: MEDICARE

## 2025-01-10 DIAGNOSIS — N18.30 STAGE 3 CHRONIC KIDNEY DISEASE, UNSPECIFIED WHETHER STAGE 3A OR 3B CKD (HCC): ICD-10-CM

## 2025-01-10 LAB
ALBUMIN SERPL-MCNC: 4.4 G/DL (ref 3.2–4.8)
ANION GAP SERPL CALC-SCNC: 11 MMOL/L (ref 0–18)
BUN BLD-MCNC: 17 MG/DL (ref 9–23)
BUN/CREAT SERPL: 13.7 (ref 10–20)
CALCIUM BLD-MCNC: 9.4 MG/DL (ref 8.7–10.4)
CHLORIDE SERPL-SCNC: 107 MMOL/L (ref 98–112)
CO2 SERPL-SCNC: 24 MMOL/L (ref 21–32)
CREAT BLD-MCNC: 1.24 MG/DL
CREAT UR-SCNC: 42.1 MG/DL
EGFRCR SERPLBLD CKD-EPI 2021: 50 ML/MIN/1.73M2 (ref 60–?)
GLUCOSE BLD-MCNC: 99 MG/DL (ref 70–99)
OSMOLALITY SERPL CALC.SUM OF ELEC: 296 MOSM/KG (ref 275–295)
PHOSPHATE SERPL-MCNC: 2.6 MG/DL (ref 2.4–5.1)
POTASSIUM SERPL-SCNC: 4.3 MMOL/L (ref 3.5–5.1)
PROT UR-MCNC: <6 MG/DL (ref ?–14)
PTH-INTACT SERPL-MCNC: 85.3 PG/ML (ref 18.5–88)
SODIUM SERPL-SCNC: 142 MMOL/L (ref 136–145)

## 2025-01-10 PROCEDURE — 83970 ASSAY OF PARATHORMONE: CPT

## 2025-01-10 PROCEDURE — 80069 RENAL FUNCTION PANEL: CPT

## 2025-01-10 PROCEDURE — 82570 ASSAY OF URINE CREATININE: CPT

## 2025-01-10 PROCEDURE — 84156 ASSAY OF PROTEIN URINE: CPT

## 2025-01-10 PROCEDURE — 36415 COLL VENOUS BLD VENIPUNCTURE: CPT

## 2025-01-22 ENCOUNTER — OFFICE VISIT (OUTPATIENT)
Facility: CLINIC | Age: 60
End: 2025-01-22

## 2025-01-22 VITALS
SYSTOLIC BLOOD PRESSURE: 133 MMHG | HEART RATE: 89 BPM | DIASTOLIC BLOOD PRESSURE: 78 MMHG | BODY MASS INDEX: 42.44 KG/M2 | HEIGHT: 68 IN | WEIGHT: 280 LBS

## 2025-01-22 DIAGNOSIS — Z90.5 ACQUIRED SOLITARY KIDNEY: ICD-10-CM

## 2025-01-22 DIAGNOSIS — E66.813 CLASS 3 SEVERE OBESITY WITH BODY MASS INDEX (BMI) OF 40.0 TO 44.9 IN ADULT, UNSPECIFIED OBESITY TYPE, UNSPECIFIED WHETHER SERIOUS COMORBIDITY PRESENT (HCC): ICD-10-CM

## 2025-01-22 DIAGNOSIS — N25.81 SECONDARY HYPERPARATHYROIDISM OF RENAL ORIGIN (HCC): ICD-10-CM

## 2025-01-22 DIAGNOSIS — E66.01 CLASS 3 SEVERE OBESITY WITH BODY MASS INDEX (BMI) OF 40.0 TO 44.9 IN ADULT, UNSPECIFIED OBESITY TYPE, UNSPECIFIED WHETHER SERIOUS COMORBIDITY PRESENT (HCC): ICD-10-CM

## 2025-01-22 DIAGNOSIS — N18.30 STAGE 3 CHRONIC KIDNEY DISEASE, UNSPECIFIED WHETHER STAGE 3A OR 3B CKD (HCC): Primary | ICD-10-CM

## 2025-01-22 PROCEDURE — 99214 OFFICE O/P EST MOD 30 MIN: CPT | Performed by: INTERNAL MEDICINE

## 2025-01-22 NOTE — PROGRESS NOTES
[facility]    Progress Note     Codie Andrade  59 yr old lady here for follow up.  Known to have solitary right kidney ( s/p left nephrectomy following trauma ) with CKD  PMH HLD ( intolerance to most medications ) , Obesity & Nephrolithiasis ( remote h/o R kidney calculus)  . No h/o long term or recent OTC NSAID use No family h/o kidney disease   NO new issues  trying to lose weight. No longer considering bariatric surg  Feels tired on an off  No cp or sob  Good BP control    Since LOV  Did egd/colonoscopy  Had  h pylori      HISTORY:  Past Medical History:    Anxiety    Arthritis    neck and back    Depression    Disorder of thyroid    Renal disorder    left kidney removed      Past Surgical History:   Procedure Laterality Date    Addl neck spine fusion      Ankle fracture surgery      L ankle sx    Colonoscopy N/A 4/28/2023    Procedure: COLONOSCOPY;  Surgeon: Alfred Arreola MD;  Location: [facility] ENDOSCOPY    Colonoscopy N/A 6/12/2024    Procedure: COLONOSCOPY;  Surgeon: Chanel Arvizu MD;  Location: [facility] ENDOSCOPY    Nephrectomy Left            Medications (Active prior to today's visit):  Current Outpatient Medications   Medication Sig Dispense Refill    DULoxetine 30 MG Oral Cap DR Particles Take 1 capsule (30 mg total) by mouth daily.      busPIRone 10 MG Oral Tab Take 1 tablet (10 mg total) by mouth at bedtime.      Levothyroxine Sodium 125 MCG Oral Tab Take 1 tablet (125 mcg total) by mouth before breakfast.      nystatin 037370 UNIT/GM External Cream Apply topically.         Allergies:  Allergies   Allergen Reactions    Codeine UNKNOWN    Ibuprofen UNKNOWN         ROS:     Constitutional:  Negative for decreased activity, fever, irritability and lethargy  ENMT:  Negative for ear drainage, hearing loss and nasal drainage  Eyes:  Negative for eye discharge and vision loss  Cardiovascular:  Negative for chest pain, sob  Respiratory:  Negative for cough, dyspnea and  wheezing  Gastrointestinal:  Negative for abdominal pain, constipation  Genitourinary:  Negative for dysuria and hematuria  Endocrine:  Negative for abnormal sleep patterns  Hema/Lymph:  Negative for easy bleeding and easy bruising  Integumentary:  Negative for pruritus and rash  Musculoskeletal:  Negative for bone/joint symptoms  Neurological:  Negative for gait disturbance  Psychiatric:  Negative for inappropriate interaction and psychiatric symptoms      Vitals:    01/22/25 1050   BP: 133/78   Pulse: 89       PHYSICAL EXAM:   Constitutional: appears well hydrated alert and responsive   Head/Face: normocephalic  Eyes/Vision: normal extraocular motion is intact  Nose/Mouth/Throat:mucous membranes are moist   Neck/Thyroid: neck is supple without adenopathy  Respiratory:  lungs are clear to auscultation bilaterally  Cardiovascular: regular rate and rhythm   Abdomen: soft, non-tender, non-distended, BS normal  Skin/Hair: no unusual rashes present, no abnormal bruising noted  Musculoskeletal: no deformities  Extremities: no edema  Neurological:  Grossly normal      Lab Results   Component Value Date     01/10/2025     03/12/2024     11/17/2022    K 4.3 01/10/2025    K 4.6 03/12/2024    K 4.5 11/17/2022     01/10/2025     03/12/2024     11/17/2022    CO2 24.0 01/10/2025    CO2 25.0 03/12/2024    CO2 25.0 11/17/2022    BUN 17 01/10/2025    BUN 21 03/12/2024    BUN 15 11/17/2022    CREATSERUM 1.24 (H) 01/10/2025    CREATSERUM 1.28 (H) 03/12/2024    CREATSERUM 1.28 (H) 11/17/2022    CA 9.4 01/10/2025    CA 9.8 03/12/2024    CA 9.3 11/17/2022    EGFRCR 50 (L) 01/10/2025    EGFRCR 48 (L) 03/12/2024    EGFRCR 49 (L) 11/17/2022    ALB 4.4 01/10/2025    ALB 4.3 03/12/2024    ALB 3.8 09/13/2022    PHOS 2.6 01/10/2025    PHOS 3.3 03/12/2024    PHOS 2.8 09/13/2022    PTH 85.3 01/10/2025    PTH 75.0 03/12/2024    PTH 99.6 (H) 11/30/2021          ASSESSMENT/PLAN:   Assessment   1. Stage 3 chronic  kidney disease, unspecified whether stage 3a or 3b CKD (HCC)    2. Acquired solitary kidney    3. Secondary hyperparathyroidism of renal origin (HCC)    4. Class 3 severe obesity with body mass index (BMI) of 40.0 to 44.9 in adult, unspecified obesity type, unspecified whether serious comorbidity present (HCC)           CKD 3  CKD sec to solitary kidney.  Kidney US showed fairly well maintained R kidney size . No proteinuria noted . Reiterated she avoid use of OTC NSAIDs( Advil , Aleve, Ibuprofen etc ) & OTC PPIs ( Omeprazole etc ) due to their potential nephrotoxicity. At high risk of CKD progression given morbid obesity & potential development of HTN .  The patient verbalized an understanding of the discussion.  Stable kidney fx    Solitary right kidney  S/p left nephrectomy ( traumatic injury ) remote . No records available to me.    Secondary HPTH  Monitor ca/phosp, ipth    Morbid obesity  Counseled on weight reduction & lifestyle modification again.   Can try mounjaro or ozempic ( GLP1 agonist ) for weight loss  Independednt risk factor for CKD progression    PLAN  Renal fx is stable  Ok to use Mounjaro or Ozempic (GLP1) agonist to lose weight  Labs and fu once a year         Orders This Visit:  No orders of the defined types were placed in this encounter.      Meds This Visit:  Requested Prescriptions      No prescriptions requested or ordered in this encounter       Imaging & Referrals:  None       Katerina Ferrer MD  1/22/2025

## (undated) DEVICE — V2 SPECIMEN COLLECTION TRAY: Brand: NEPTUNE

## (undated) DEVICE — SNARE ENDOSCOPIC 10MM ROUND

## (undated) DEVICE — 60 ML SYRINGE REGULAR TIP: Brand: MONOJECT

## (undated) DEVICE — SNARE CAPTI HEX STIFF MEDIUM

## (undated) DEVICE — KIT ENDO ORCAPOD 160/180/190

## (undated) DEVICE — MEDI-VAC NON-CONDUCTIVE SUCTION TUBING 6MM X 1.8M (6FT.) L: Brand: CARDINAL HEALTH

## (undated) DEVICE — GIJAW SINGLE-USE BIOPSY FORCEPS WITH NEEDLE: Brand: GIJAW

## (undated) DEVICE — CONMED SCOPE SAVER BITE BLOCK, 20X27 MM: Brand: SCOPE SAVER

## (undated) DEVICE — Device: Brand: DUAL NARE NASAL CANNULAE FEMALE LUER CON 7FT O2 TUBE

## (undated) DEVICE — MEDI-VAC NON-CONDUCTIVE SUCTION TUBING: Brand: CARDINAL HEALTH

## (undated) DEVICE — SYRINGE, LUER SLIP, STERILE, 60ML: Brand: MEDLINE

## (undated) DEVICE — NEEDLE CONTRAST INTERJECT 25G

## (undated) DEVICE — CLIP LGT 11MM OPEN 2.8MM 235CM

## (undated) DEVICE — SNARE OPTMZ PLPCTM TRP

## (undated) DEVICE — REM POLYHESIVE ADULT PATIENT RETURN ELECTRODE: Brand: VALLEYLAB

## (undated) DEVICE — KIT CLEAN ENDOKIT 1.1OZ GOWNX2

## (undated) DEVICE — Device: Brand: SPOT EX ENDOSCOPIC TATTOO

## (undated) DEVICE — LASSO POLYPECTOMY SNARE: Brand: LASSO

## (undated) DEVICE — YANKAUER,BULB TIP,W/O VENT,RIGID,STERILE: Brand: MEDLINE

## (undated) NOTE — LETTER
5/2/2019      Unknown Press SILVIA Hutchison MD  Physical Medicine and Rehabilitation  [address] [facility], [address] [place] [address]  Dept: [phone]  Dept Fax: [phone]        RE: Consultation for Devorah Brito        Dear Steffi Bowser MD,    Thank you v

## (undated) NOTE — LETTER
4/1/2019      Mary Christie MD  Physical Medicine and Rehabilitation  [address] [address]  Dept: [phone]  Dept Fax: [phone]        RE: Consultation for Lucien Flores        Dear Amy Robles MD,    Thank you v

## (undated) NOTE — LETTER
VIET. Notifier: [employer]   EM. Patient Name: Masha Bettencourt. Identification Number: [id number]      Advance Beneficiary Notice of Noncoverage (ABN)  NOTE:  If Medicare doesn’t pay for D. Right shoulder injection under ultrasound guidance below, y I made to you, less co-pays or deductibles. ? OPTION 2. I want the D. Right shoulder injection under ultrasound guidance listed above, but do not bill Medicare. You may ask to be paid now as I am responsible for payment.  I cannot appeal if Medicare is no

## (undated) NOTE — LETTER
[address]  Authorization for Invasive Procedure                                                                                           I hereby authorize Hudson Chang MD, my physician and his/her assistants (if applicable), which may include medical students, residents, and/or fellows, to perform the following surgical operation/ procedure and administer such anesthesia as may be determined necessary by my physician: Operation/Procedure name (s) COLONOSCOPY on Charolett Folds   2. I recognize that during the surgical operation/procedure, unforeseen conditions may necessitate additional or different procedures than those listed above. I, therefore, further authorize and request that the above-named surgeon, assistants, or designees perform such procedures as are, in their judgment, necessary and desirable. 3.   My surgeon/physician has discussed prior to my surgery the potential benefits, risks and side effects of this procedure; the likelihood of achieving goals; and potential problems that might occur during recuperation. They also discussed reasonable alternatives to the procedure, including risks, benefits, and side effects related to the alternatives and risks related to not receiving this procedure. I have had all my questions answered and I acknowledge that no guarantee has been made as to the result that may be obtained. 4.   Should the need arise during my operation/procedure, which includes change of level of care prior to discharge, I also consent to the administration of blood and/or blood products. Further, I understand that despite careful testing and screening of blood or blood products by collecting agencies, I may still be subject to ill effects as a result of receiving a blood transfusion and/or blood products.   The following are some, but not all, of the potential risks that can occur: fever and allergic reactions, hemolytic reactions, transmission of diseases such as Hepatitis, AIDS and Cytomegalovirus (CMV) and fluid overload. In the event that I wish to have an autologous transfusion of my own blood, or a directed donor transfusion, I will discuss this with my physician. Check only if Refusing Blood or Blood Products  I understand refusal of blood or blood products as deemed necessary by my physician may have serious consequences to my condition to include possible death. I hereby assume responsibility for my refusal and release the hospital, its personnel, and my physicians from any responsibility for the consequences of my refusal.    o  Refuse   5. I authorize the use of any specimen, organs, tissues, body parts or foreign objects that may be removed from my body during the operation/procedure for diagnosis, research or teaching purposes and their subsequent disposal by hospital authorities. I also authorize the release of specimen test results and/or written reports to my treating physician on the hospital medical staff or other referring or consulting physicians involved in my care, at the discretion of the Pathologist or my treating physician. 6.   I consent to the photographing or videotaping of the operations or procedures to be performed, including appropriate portions of my body for medical, scientific, or educational purposes, provided my identity is not revealed by the pictures or by descriptive texts accompanying them. If the procedure has been photographed/videotaped, the surgeon will obtain the original picture, image, videotape or CD. The hospital will not be responsible for storage, release or maintenance of the picture, image, tape or CD.    7.   I consent to the presence of a  or observers in the operating room as deemed necessary by my physician or their designees.     8.   I recognize that in the event my procedure results in extended X-Ray/fluoroscopy time, I may develop a skin reaction. 9. If I have a Do Not Attempt Resuscitation (DNAR) order in place, that status will be suspended while in the operating room, procedural suite, and during the recovery period unless otherwise explicitly stated by me (or a person authorized to consent on my behalf). The surgeon or my attending physician will determine when the applicable recovery period ends for purposes of reinstating the DNAR order. 10. Patients having a sterilization procedure: I understand that if the procedure is successful the results will be permanent and it will therefore be impossible for me to inseminate, conceive, or bear children. I also understand that the procedure is intended to result in sterility, although the result has not been guaranteed. 11. I acknowledge that my physician has explained sedation/analgesia administration to me including the risk and benefits I consent to the administration of sedation/analgesia as may be necessary or desirable in the judgment of my physician. I CERTIFY THAT I HAVE READ AND FULLY UNDERSTAND THE ABOVE CONSENT TO OPERATION and/or OTHER PROCEDURE.     _________________________________________ _________________________________     ___________________________________  Signature of Patient     Signature of Responsible Person                   Printed Name of Responsible Person                              _________________________________________ ______________________________        ___________________________________  Signature of Witness         Date  Time         Relationship to Patient    STATEMENT OF PHYSICIAN My signature below affirms that prior to the time of the procedure; I have explained to the patient and/or his/her legal representative, the risks and benefits involved in the proposed treatment and any reasonable alternative to the proposed treatment.  I have also explained the risks and benefits involved in refusal of the proposed treatment and alternatives to the proposed treatment and have answered the patient's questions.  If I have a significant financial interest in a co-management agreement or a significant financial interest in any product or implant, or other significant relationship used in this procedure/surgery, I have disclosed this and had a discussion with my patient.     _______________________________________________________________ _____________________________  (Signature of Physician)                                                                                         (Date)                                   (Time)  Patient Name: Blaire Oneill    : 1965   Printed: 2023      Medical Record #: [id number]                                              Page 1 of

## (undated) NOTE — LETTER
[facility]  Administration of Anesthesia  Codie CAMACHO agree to be cared for by a physician anesthesiologist alone and/or with a nurse anesthetist, who is specially trained to monitor me and give me medicine to put me to sleep or keep me comfortable during my procedure    I understand that my anesthesiologist and/or anesthetist is not an employee or agent of [facility] or [employer]. He or she works for [facility]    As the patient asking for anesthesia services, I agree to:  Allow the anesthesiologist (anesthesia doctor) to give me medicine and do additional procedures as necessary. Some examples are: Starting or using an “IV” to give me medicine, fluids or blood during my procedure, and having a breathing tube placed to help me breathe when I’m asleep (intubation). In the event that my heart stops working properly, I understand that my anesthesiologist will make every effort to sustain my life, unless otherwise directed by [facility] Do Not Resuscitate documents.  Tell my anesthesia doctor before my procedure:  If I am pregnant.  The last time that I ate or drank.  iii. All of the medicines I take (including prescriptions, herbal supplements, and pills I can buy without a prescription (including street drugs/illegal medications). Failure to inform my anesthesiologist about these medicines may increase my risk of anesthetic complications.  iv.If I am allergic to anything or have had a reaction to anesthesia before.  I understand how the anesthesia medicine will help me (benefits).  I understand that with any type of anesthesia medicine there are risks:  The most common risks are: nausea, vomiting, sore throat, muscle soreness, damage to my eyes, mouth, or teeth (from breathing tube placement).  Rare risks include: remembering what happened during my procedure, allergic reactions to medications, injury to my airway, heart, lungs, vision, nerves,  or muscles and in extremely rare instances death.  My doctor has explained to me other choices available to me for my care (alternatives).  Pregnant Patients (“epidural”):  I understand that the risks of having an epidural (medicine given into my back to help control pain during labor), include itching, low blood pressure, difficulty urinating, headache or slowing of the baby’s heart. Very rare risks include infection, bleeding, seizure, irregular heart rhythms and nerve injury.  Regional Anesthesia (“spinal”, “epidural”, & “nerve blocks”):  I understand that rare but potential complications include headache, bleeding, infection, seizure, irregular heart rhythms, and nerve injury.    _____________________________________________________________________________  Patient (or Representative) Signature/Relationship to Patient  Date   Time    _____________________________________________________________________________   Name (if used)    Language/Organization   Time    _____________________________________________________________________________  Nurse Anesthetist Signature     Date   Time  _____________________________________________________________________________  Anesthesiologist Signature     Date   Time  I have discussed the procedure and information above with the patient (or patient’s representative) and answered their questions. The patient or their representative has agreed to have anesthesia services.    _____________________________________________________________________________  Witness        Date   Time  I have verified that the signature is that of the patient or patient’s representative, and that it was signed before the procedure  Patient Name: Codie Andrade     : 1965                 Printed: 2024 at 10:34 AM    Medical Record #: [id number]                                            Page 1 of 1  ----------ANESTHESIA CONSENT----------

## (undated) NOTE — LETTER
[facility]  [address], [place], [place]    Authorization for Surgical Operation and Procedure                               I hereby authorize Chanel Arvizu MD, my physician and his/her assistants (if applicable), which may include medical students, residents, and/or fellows, to perform the following surgical operation/ procedure and administer such anesthesia as may be determined necessary by my physician: Operation/Procedure name (s) COLONOSCOPY/ESOPHAGOGASTRODUODENOSCOPY on Codie Andrade   2.   I recognize that during the surgical operation/procedure, unforeseen conditions may necessitate additional or different procedures than those listed above.  I, therefore, further authorize and request that the above-named surgeon, assistants, or designees perform such procedures as are, in their judgment, necessary and desirable.    3.   My surgeon/physician has discussed prior to my surgery the potential benefits, risks and side effects of this procedure; the likelihood of achieving goals; and potential problems that might occur during recuperation.  They also discussed reasonable alternatives to the procedure, including risks, benefits, and side effects related to the alternatives and risks related to not receiving this procedure.  I have had all my questions answered and I acknowledge that no guarantee has been made as to the result that may be obtained.    4.   Should the need arise during my operation/procedure, which includes change of level of care prior to discharge, I also consent to the administration of blood and/or blood products.  Further, I understand that despite careful testing and screening of blood or blood products by collecting agencies, I may still be subject to ill effects as a result of receiving a blood transfusion and/or blood products.  The following are some, but not all, of the potential risks that can occur: fever and allergic reactions, hemolytic reactions,  transmission of diseases such as Hepatitis, AIDS and Cytomegalovirus (CMV) and fluid overload.  In the event that I wish to have an autologous transfusion of my own blood, or a directed donor transfusion, I will discuss this with my physician.  Check only if Refusing Blood or Blood Products  I understand refusal of blood or blood products as deemed necessary by my physician may have serious consequences to my condition to include possible death. I hereby assume responsibility for my refusal and release the hospital, its personnel, and my physicians from any responsibility for the consequences of my refusal.    o  Refuse   5.   I authorize the use of any specimen, organs, tissues, body parts or foreign objects that may be removed from my body during the operation/procedure for diagnosis, research or teaching purposes and their subsequent disposal by hospital authorities.  I also authorize the release of specimen test results and/or written reports to my treating physician on the hospital medical staff or other referring or consulting physicians involved in my care, at the discretion of the Pathologist or my treating physician.    6.   I consent to the photographing or videotaping of the operations or procedures to be performed, including appropriate portions of my body for medical, scientific, or educational purposes, provided my identity is not revealed by the pictures or by descriptive texts accompanying them.  If the procedure has been photographed/videotaped, the surgeon will obtain the original picture, image, videotape or CD.  The hospital will not be responsible for storage, release or maintenance of the picture, image, tape or CD.    7.   I consent to the presence of a  or observers in the operating room as deemed necessary by my physician or their designees.    8.   I recognize that in the event my procedure results in extended X-Ray/fluoroscopy time, I may develop a skin reaction.    9. If  I have a Do Not Attempt Resuscitation (DNAR) order in place, that status will be suspended while in the operating room, procedural suite, and during the recovery period unless otherwise explicitly stated by me (or a person authorized to consent on my behalf). The surgeon or my attending physician will determine when the applicable recovery period ends for purposes of reinstating the DNAR order.  10. Patients having a sterilization procedure: I understand that if the procedure is successful the results will be permanent and it will therefore be impossible for me to inseminate, conceive, or bear children.  I also understand that the procedure is intended to result in sterility, although the result has not been guaranteed.   11. I acknowledge that my physician has explained sedation/analgesia administration to me including the risk and benefits I consent to the administration of sedation/analgesia as may be necessary or desirable in the judgment of my physician.    I CERTIFY THAT I HAVE READ AND FULLY UNDERSTAND THE ABOVE CONSENT TO OPERATION and/or OTHER PROCEDURE.     ____________________________________  _________________________________        ______________________________  Signature of Patient    Signature of Responsible Person                Printed Name of Responsible Person                                      ____________________________________  _____________________________                ________________________________  Signature of Witness        Date  Time         Relationship to Patient    STATEMENT OF PHYSICIAN My signature below affirms that prior to the time of the procedure; I have explained to the patient and/or his/her legal representative, the risks and benefits involved in the proposed treatment and any reasonable alternative to the proposed treatment. I have also explained the risks and benefits involved in refusal of the proposed treatment and alternatives to the proposed treatment and have  answered the patient's questions. If I have a significant financial interest in a co-management agreement or a significant financial interest in any product or implant, or other significant relationship used in this procedure/surgery, I have disclosed this and had a discussion with my patient.     _____________________________________________________              _____________________________  (Signature of Physician)                                                                                         (Date)                                   (Time)  Patient Name: Codie Ferrer Lupe      : 1965      Printed: 2024     Medical Record #: [id number]                                      Page 1 of 1

## (undated) NOTE — LETTER
AUTHORIZATION FOR SURGICAL OPERATION OR OTHER PROCEDURE    1.  I hereby authorize Dr. Ezio Bush and the [facility] staff assigned to my case to perform the following operation and/or procedure at the [facility]:    Right shoulder injection under ultrasoun Relationship to Patient:           []  Parent    Responsible person                          []  Spouse  In case of minor or                    [] Other  _____________   Incompetent name:  __________________________________________________